# Patient Record
Sex: FEMALE | Race: WHITE | NOT HISPANIC OR LATINO | Employment: UNEMPLOYED | URBAN - METROPOLITAN AREA
[De-identification: names, ages, dates, MRNs, and addresses within clinical notes are randomized per-mention and may not be internally consistent; named-entity substitution may affect disease eponyms.]

---

## 2017-10-09 ENCOUNTER — APPOINTMENT (INPATIENT)
Dept: RADIOLOGY | Facility: HOSPITAL | Age: 52
DRG: 871 | End: 2017-10-09
Payer: COMMERCIAL

## 2017-10-09 ENCOUNTER — GENERIC CONVERSION - ENCOUNTER (OUTPATIENT)
Dept: OTHER | Facility: OTHER | Age: 52
End: 2017-10-09

## 2017-10-09 ENCOUNTER — HOSPITAL ENCOUNTER (INPATIENT)
Facility: HOSPITAL | Age: 52
LOS: 4 days | Discharge: HOME HEALTH | DRG: 871 | End: 2017-10-13
Attending: EMERGENCY MEDICINE | Admitting: ANESTHESIOLOGY
Payer: COMMERCIAL

## 2017-10-09 ENCOUNTER — APPOINTMENT (EMERGENCY)
Dept: RADIOLOGY | Facility: HOSPITAL | Age: 52
DRG: 871 | End: 2017-10-09
Payer: COMMERCIAL

## 2017-10-09 DIAGNOSIS — A41.9 SEVERE SEPSIS (HCC): ICD-10-CM

## 2017-10-09 DIAGNOSIS — A41.9 SEPTIC SHOCK (HCC): Primary | ICD-10-CM

## 2017-10-09 DIAGNOSIS — I95.9 HYPOTENSION: ICD-10-CM

## 2017-10-09 DIAGNOSIS — E11.9 DM2 (DIABETES MELLITUS, TYPE 2) (HCC): ICD-10-CM

## 2017-10-09 DIAGNOSIS — R65.20 SEVERE SEPSIS (HCC): ICD-10-CM

## 2017-10-09 DIAGNOSIS — F22 PARANOID (HCC): ICD-10-CM

## 2017-10-09 DIAGNOSIS — N17.9 ACUTE RENAL FAILURE (ARF) (HCC): ICD-10-CM

## 2017-10-09 DIAGNOSIS — N17.9 ACUTE RENAL FAILURE, UNSPECIFIED ACUTE RENAL FAILURE TYPE (HCC): ICD-10-CM

## 2017-10-09 DIAGNOSIS — R65.21 SEPTIC SHOCK (HCC): Primary | ICD-10-CM

## 2017-10-09 PROBLEM — G89.4 CHRONIC PAIN SYNDROME: Status: ACTIVE | Noted: 2017-10-09

## 2017-10-09 PROBLEM — R82.81 PYURIA: Status: ACTIVE | Noted: 2017-10-09

## 2017-10-09 PROBLEM — N30.00 ACUTE CYSTITIS: Status: ACTIVE | Noted: 2017-10-09

## 2017-10-09 PROBLEM — F41.1 GAD (GENERALIZED ANXIETY DISORDER): Status: ACTIVE | Noted: 2017-10-09

## 2017-10-09 PROBLEM — C18.9 COLON CANCER (HCC): Status: ACTIVE | Noted: 2017-10-09

## 2017-10-09 PROBLEM — R79.89 ELEVATED BRAIN NATRIURETIC PEPTIDE (BNP) LEVEL: Status: ACTIVE | Noted: 2017-10-09

## 2017-10-09 PROBLEM — D64.9 ANEMIA: Status: ACTIVE | Noted: 2017-10-09

## 2017-10-09 PROBLEM — C22.9 LIVER CANCER (HCC): Status: ACTIVE | Noted: 2017-10-09

## 2017-10-09 PROBLEM — Z90.49 STATUS POST COLECTOMY: Status: ACTIVE | Noted: 2017-10-09

## 2017-10-09 PROBLEM — Z99.89 OSA ON CPAP: Status: ACTIVE | Noted: 2017-10-09

## 2017-10-09 PROBLEM — I50.22 CHRONIC SYSTOLIC CONGESTIVE HEART FAILURE (HCC): Status: ACTIVE | Noted: 2017-10-09

## 2017-10-09 PROBLEM — F11.20 CONTINUOUS OPIOID DEPENDENCE (HCC): Status: ACTIVE | Noted: 2017-10-09

## 2017-10-09 PROBLEM — R41.82 ALTERED MENTAL STATUS: Status: ACTIVE | Noted: 2017-10-09

## 2017-10-09 PROBLEM — F32.9 MDD (MAJOR DEPRESSIVE DISORDER): Status: ACTIVE | Noted: 2017-10-09

## 2017-10-09 PROBLEM — I25.10 CAD (CORONARY ARTERY DISEASE): Status: ACTIVE | Noted: 2017-10-09

## 2017-10-09 PROBLEM — I25.10 CAD (CORONARY ARTERY DISEASE): Status: RESOLVED | Noted: 2017-10-09 | Resolved: 2017-10-09

## 2017-10-09 PROBLEM — D32.0 CEREBRAL MENINGIOMA (HCC): Status: ACTIVE | Noted: 2017-10-09

## 2017-10-09 PROBLEM — E78.5 HLD (HYPERLIPIDEMIA): Status: ACTIVE | Noted: 2017-10-09

## 2017-10-09 PROBLEM — G47.33 OSA ON CPAP: Status: ACTIVE | Noted: 2017-10-09

## 2017-10-09 PROBLEM — I10 HTN (HYPERTENSION): Status: ACTIVE | Noted: 2017-10-09

## 2017-10-09 LAB
ACETONE SERPL-MCNC: ABNORMAL MG/DL
ALBUMIN SERPL BCP-MCNC: 2.9 G/DL (ref 3.5–5)
ALP SERPL-CCNC: 108 U/L (ref 46–116)
ALT SERPL W P-5'-P-CCNC: 21 U/L (ref 12–78)
AMMONIA PLAS-SCNC: 30 UMOL/L (ref 11–35)
ANION GAP BLD CALC-SCNC: 21 MMOL/L (ref 4–13)
ANION GAP SERPL CALCULATED.3IONS-SCNC: 14 MMOL/L (ref 4–13)
ANION GAP SERPL CALCULATED.3IONS-SCNC: 15 MMOL/L (ref 4–13)
APTT PPP: 34 SECONDS (ref 23–35)
AST SERPL W P-5'-P-CCNC: 40 U/L (ref 5–45)
ATRIAL RATE: 121 BPM
BACTERIA UR QL AUTO: ABNORMAL /HPF
BASE EXCESS BLDA CALC-SCNC: -7.5 MMOL/L
BASOPHILS # BLD MANUAL: 0 THOUSAND/UL (ref 0–0.1)
BASOPHILS NFR MAR MANUAL: 0 % (ref 0–1)
BILIRUB SERPL-MCNC: 0.63 MG/DL (ref 0.2–1)
BILIRUB UR QL STRIP: NEGATIVE
BUN BLD-MCNC: 76 MG/DL (ref 5–25)
BUN SERPL-MCNC: 72 MG/DL (ref 5–25)
BUN SERPL-MCNC: 73 MG/DL (ref 5–25)
BURR CELLS BLD QL SMEAR: PRESENT
CA-I BLD-SCNC: 1.11 MMOL/L (ref 1.12–1.32)
CALCIUM SERPL-MCNC: 8.5 MG/DL (ref 8.3–10.1)
CALCIUM SERPL-MCNC: 8.8 MG/DL (ref 8.3–10.1)
CHLORIDE BLD-SCNC: 92 MMOL/L (ref 100–108)
CHLORIDE SERPL-SCNC: 100 MMOL/L (ref 100–108)
CHLORIDE SERPL-SCNC: 93 MMOL/L (ref 100–108)
CK MB SERPL-MCNC: 4.4 NG/ML (ref 0–5)
CK MB SERPL-MCNC: <1 % (ref 0–2.5)
CK SERPL-CCNC: 1088 U/L (ref 26–192)
CLARITY UR: ABNORMAL
CO2 SERPL-SCNC: 18 MMOL/L (ref 21–32)
CO2 SERPL-SCNC: 19 MMOL/L (ref 21–32)
COLOR UR: YELLOW
CORTIS SERPL-MCNC: 100.8 UG/DL
CREAT BLD-MCNC: 6.8 MG/DL (ref 0.6–1.3)
CREAT SERPL-MCNC: 6.57 MG/DL (ref 0.6–1.3)
CREAT SERPL-MCNC: 6.89 MG/DL (ref 0.6–1.3)
EOSINOPHIL # BLD MANUAL: 0 THOUSAND/UL (ref 0–0.4)
EOSINOPHIL NFR BLD MANUAL: 0 % (ref 0–6)
ERYTHROCYTE [DISTWIDTH] IN BLOOD BY AUTOMATED COUNT: 14.8 % (ref 11.6–15.1)
FINE GRAN CASTS URNS QL MICRO: ABNORMAL /LPF
GFR SERPL CREATININE-BSD FRML MDRD: 6 ML/MIN/1.73SQ M
GFR SERPL CREATININE-BSD FRML MDRD: 6 ML/MIN/1.73SQ M
GFR SERPL CREATININE-BSD FRML MDRD: 7 ML/MIN/1.73SQ M
GLUCOSE SERPL-MCNC: 376 MG/DL (ref 65–140)
GLUCOSE SERPL-MCNC: 381 MG/DL (ref 65–140)
GLUCOSE SERPL-MCNC: 454 MG/DL (ref 65–140)
GLUCOSE SERPL-MCNC: 491 MG/DL (ref 65–140)
GLUCOSE SERPL-MCNC: 492 MG/DL (ref 65–140)
GLUCOSE UR STRIP-MCNC: ABNORMAL MG/DL
HCO3 BLDA-SCNC: 16.5 MMOL/L (ref 22–28)
HCT VFR BLD AUTO: 33.2 % (ref 34.8–46.1)
HCT VFR BLD CALC: 36 % (ref 34.8–46.1)
HGB BLD-MCNC: 10.8 G/DL (ref 11.5–15.4)
HGB BLDA-MCNC: 12.2 G/DL (ref 11.5–15.4)
HGB UR QL STRIP.AUTO: ABNORMAL
INR PPP: 1.29 (ref 0.86–1.16)
KETONES UR STRIP-MCNC: NEGATIVE MG/DL
LACTATE SERPL-SCNC: 1.6 MMOL/L (ref 0.5–2)
LACTATE SERPL-SCNC: 2.1 MMOL/L (ref 0.5–2)
LACTATE SERPL-SCNC: 3.1 MMOL/L (ref 0.5–2)
LEUKOCYTE ESTERASE UR QL STRIP: ABNORMAL
LYMPHOCYTES # BLD AUTO: 0.34 THOUSAND/UL (ref 0.6–4.47)
LYMPHOCYTES # BLD AUTO: 2 % (ref 14–44)
MCH RBC QN AUTO: 26 PG (ref 26.8–34.3)
MCHC RBC AUTO-ENTMCNC: 32.5 G/DL (ref 31.4–37.4)
MCV RBC AUTO: 80 FL (ref 82–98)
MONOCYTES # BLD AUTO: 0.69 THOUSAND/UL (ref 0–1.22)
MONOCYTES NFR BLD: 4 % (ref 4–12)
NEUTROPHILS # BLD MANUAL: 16.21 THOUSAND/UL (ref 1.85–7.62)
NEUTS SEG NFR BLD AUTO: 94 % (ref 43–75)
NITRITE UR QL STRIP: NEGATIVE
NON-SQ EPI CELLS URNS QL MICRO: ABNORMAL /HPF
NRBC BLD AUTO-RTO: 0 /100 WBCS
NT-PROBNP SERPL-MCNC: 7285 PG/ML
O2 CT BLDA-SCNC: 15.1 ML/DL (ref 16–23)
OSMOLALITY UR: 337 MMOL/KG
OXYHGB MFR BLDA: 96.9 % (ref 94–97)
P AXIS: 72 DEGREES
PCO2 BLD: 19 MMOL/L (ref 21–32)
PCO2 BLDA: 28.6 MM HG (ref 36–44)
PH BLDA: 7.38 [PH] (ref 7.35–7.45)
PH UR STRIP.AUTO: 5.5 [PH] (ref 4.5–8)
PLATELET # BLD AUTO: 239 THOUSANDS/UL (ref 149–390)
PLATELET # BLD AUTO: 273 THOUSANDS/UL (ref 149–390)
PLATELET BLD QL SMEAR: ADEQUATE
PMV BLD AUTO: 11.5 FL (ref 8.9–12.7)
PMV BLD AUTO: 12 FL (ref 8.9–12.7)
PO2 BLDA: 104.5 MM HG (ref 75–129)
POIKILOCYTOSIS BLD QL SMEAR: PRESENT
POTASSIUM BLD-SCNC: 5 MMOL/L (ref 3.5–5.3)
POTASSIUM SERPL-SCNC: 3.8 MMOL/L (ref 3.5–5.3)
POTASSIUM SERPL-SCNC: 4.9 MMOL/L (ref 3.5–5.3)
PR INTERVAL: 136 MS
PROT SERPL-MCNC: 7.5 G/DL (ref 6.4–8.2)
PROT UR STRIP-MCNC: ABNORMAL MG/DL
PROTHROMBIN TIME: 16.2 SECONDS (ref 12.1–14.4)
QRS AXIS: 75 DEGREES
QRSD INTERVAL: 86 MS
QT INTERVAL: 342 MS
QTC INTERVAL: 485 MS
RBC # BLD AUTO: 4.16 MILLION/UL (ref 3.81–5.12)
RBC #/AREA URNS AUTO: ABNORMAL /HPF
RBC MORPH BLD: PRESENT
SODIUM 24H UR-SCNC: 61 MOL/L
SODIUM BLD-SCNC: 127 MMOL/L (ref 136–145)
SODIUM SERPL-SCNC: 127 MMOL/L (ref 136–145)
SODIUM SERPL-SCNC: 132 MMOL/L (ref 136–145)
SP GR UR STRIP.AUTO: 1.02 (ref 1–1.03)
SPECIMEN SOURCE: ABNORMAL
SPECIMEN SOURCE: ABNORMAL
T WAVE AXIS: 71 DEGREES
UROBILINOGEN UR QL STRIP.AUTO: 0.2 E.U./DL
VENTRICULAR RATE: 121 BPM
WBC # BLD AUTO: 17.24 THOUSAND/UL (ref 4.31–10.16)
WBC #/AREA URNS AUTO: ABNORMAL /HPF

## 2017-10-09 PROCEDURE — 82533 TOTAL CORTISOL: CPT | Performed by: PHYSICIAN ASSISTANT

## 2017-10-09 PROCEDURE — 87077 CULTURE AEROBIC IDENTIFY: CPT | Performed by: EMERGENCY MEDICINE

## 2017-10-09 PROCEDURE — 93005 ELECTROCARDIOGRAM TRACING: CPT | Performed by: EMERGENCY MEDICINE

## 2017-10-09 PROCEDURE — 02HV33Z INSERTION OF INFUSION DEVICE INTO SUPERIOR VENA CAVA, PERCUTANEOUS APPROACH: ICD-10-PCS | Performed by: HOSPITALIST

## 2017-10-09 PROCEDURE — 80053 COMPREHEN METABOLIC PANEL: CPT | Performed by: EMERGENCY MEDICINE

## 2017-10-09 PROCEDURE — 85014 HEMATOCRIT: CPT

## 2017-10-09 PROCEDURE — 80047 BASIC METABLC PNL IONIZED CA: CPT

## 2017-10-09 PROCEDURE — 85027 COMPLETE CBC AUTOMATED: CPT | Performed by: EMERGENCY MEDICINE

## 2017-10-09 PROCEDURE — 84300 ASSAY OF URINE SODIUM: CPT | Performed by: PHYSICIAN ASSISTANT

## 2017-10-09 PROCEDURE — 82948 REAGENT STRIP/BLOOD GLUCOSE: CPT

## 2017-10-09 PROCEDURE — 87186 SC STD MICRODIL/AGAR DIL: CPT | Performed by: EMERGENCY MEDICINE

## 2017-10-09 PROCEDURE — 36415 COLL VENOUS BLD VENIPUNCTURE: CPT | Performed by: EMERGENCY MEDICINE

## 2017-10-09 PROCEDURE — 83605 ASSAY OF LACTIC ACID: CPT | Performed by: EMERGENCY MEDICINE

## 2017-10-09 PROCEDURE — 70450 CT HEAD/BRAIN W/O DYE: CPT

## 2017-10-09 PROCEDURE — 82550 ASSAY OF CK (CPK): CPT | Performed by: EMERGENCY MEDICINE

## 2017-10-09 PROCEDURE — 87086 URINE CULTURE/COLONY COUNT: CPT

## 2017-10-09 PROCEDURE — 72125 CT NECK SPINE W/O DYE: CPT

## 2017-10-09 PROCEDURE — 83605 ASSAY OF LACTIC ACID: CPT | Performed by: PHYSICIAN ASSISTANT

## 2017-10-09 PROCEDURE — 83880 ASSAY OF NATRIURETIC PEPTIDE: CPT | Performed by: EMERGENCY MEDICINE

## 2017-10-09 PROCEDURE — 82009 KETONE BODYS QUAL: CPT | Performed by: PHYSICIAN ASSISTANT

## 2017-10-09 PROCEDURE — 85049 AUTOMATED PLATELET COUNT: CPT | Performed by: PHYSICIAN ASSISTANT

## 2017-10-09 PROCEDURE — 96365 THER/PROPH/DIAG IV INF INIT: CPT

## 2017-10-09 PROCEDURE — 82805 BLOOD GASES W/O2 SATURATION: CPT | Performed by: PHYSICIAN ASSISTANT

## 2017-10-09 PROCEDURE — 93005 ELECTROCARDIOGRAM TRACING: CPT

## 2017-10-09 PROCEDURE — 71010 HB CHEST X-RAY 1 VIEW FRONTAL (PORTABLE): CPT

## 2017-10-09 PROCEDURE — 36620 INSERTION CATHETER ARTERY: CPT

## 2017-10-09 PROCEDURE — 81001 URINALYSIS AUTO W/SCOPE: CPT

## 2017-10-09 PROCEDURE — 99285 EMERGENCY DEPT VISIT HI MDM: CPT

## 2017-10-09 PROCEDURE — 87077 CULTURE AEROBIC IDENTIFY: CPT

## 2017-10-09 PROCEDURE — 87186 SC STD MICRODIL/AGAR DIL: CPT

## 2017-10-09 PROCEDURE — 85007 BL SMEAR W/DIFF WBC COUNT: CPT | Performed by: EMERGENCY MEDICINE

## 2017-10-09 PROCEDURE — 74176 CT ABD & PELVIS W/O CONTRAST: CPT

## 2017-10-09 PROCEDURE — 87040 BLOOD CULTURE FOR BACTERIA: CPT | Performed by: EMERGENCY MEDICINE

## 2017-10-09 PROCEDURE — 71250 CT THORAX DX C-: CPT

## 2017-10-09 PROCEDURE — 82553 CREATINE MB FRACTION: CPT | Performed by: EMERGENCY MEDICINE

## 2017-10-09 PROCEDURE — 85730 THROMBOPLASTIN TIME PARTIAL: CPT | Performed by: EMERGENCY MEDICINE

## 2017-10-09 PROCEDURE — 83935 ASSAY OF URINE OSMOLALITY: CPT | Performed by: PHYSICIAN ASSISTANT

## 2017-10-09 PROCEDURE — 96361 HYDRATE IV INFUSION ADD-ON: CPT

## 2017-10-09 PROCEDURE — 85610 PROTHROMBIN TIME: CPT | Performed by: EMERGENCY MEDICINE

## 2017-10-09 PROCEDURE — 80048 BASIC METABOLIC PNL TOTAL CA: CPT | Performed by: PHYSICIAN ASSISTANT

## 2017-10-09 PROCEDURE — 82140 ASSAY OF AMMONIA: CPT | Performed by: PHYSICIAN ASSISTANT

## 2017-10-09 RX ORDER — ACETAMINOPHEN 325 MG/1
650 TABLET ORAL ONCE
Status: COMPLETED | OUTPATIENT
Start: 2017-10-09 | End: 2017-10-09

## 2017-10-09 RX ORDER — HEPARIN SODIUM 5000 [USP'U]/ML
5000 INJECTION, SOLUTION INTRAVENOUS; SUBCUTANEOUS EVERY 8 HOURS SCHEDULED
Status: DISCONTINUED | OUTPATIENT
Start: 2017-10-09 | End: 2017-10-10

## 2017-10-09 RX ORDER — NOREPINEPHRINE BITARTRATE 1 MG/ML
INJECTION, SOLUTION INTRAVENOUS
Status: COMPLETED
Start: 2017-10-09 | End: 2017-10-09

## 2017-10-09 RX ORDER — 0.9 % SODIUM CHLORIDE 0.9 %
3 VIAL (ML) INJECTION AS NEEDED
Status: DISCONTINUED | OUTPATIENT
Start: 2017-10-09 | End: 2017-10-13 | Stop reason: HOSPADM

## 2017-10-09 RX ORDER — LIDOCAINE HYDROCHLORIDE 10 MG/ML
INJECTION, SOLUTION EPIDURAL; INFILTRATION; INTRACAUDAL; PERINEURAL
Status: COMPLETED
Start: 2017-10-09 | End: 2017-10-09

## 2017-10-09 RX ORDER — ASPIRIN 81 MG/1
81 TABLET, CHEWABLE ORAL DAILY
Status: DISCONTINUED | OUTPATIENT
Start: 2017-10-09 | End: 2017-10-13 | Stop reason: HOSPADM

## 2017-10-09 RX ORDER — INSULIN ASPART 100 [IU]/ML
60 INJECTION, SUSPENSION SUBCUTANEOUS
Status: DISCONTINUED | OUTPATIENT
Start: 2017-10-09 | End: 2017-10-09

## 2017-10-09 RX ORDER — ATORVASTATIN CALCIUM 20 MG/1
20 TABLET, FILM COATED ORAL
Status: DISCONTINUED | OUTPATIENT
Start: 2017-10-09 | End: 2017-10-11

## 2017-10-09 RX ORDER — PANTOPRAZOLE SODIUM 40 MG/1
40 TABLET, DELAYED RELEASE ORAL
Status: DISCONTINUED | OUTPATIENT
Start: 2017-10-10 | End: 2017-10-11

## 2017-10-09 RX ORDER — ACETAMINOPHEN 325 MG/1
650 TABLET ORAL EVERY 6 HOURS PRN
Status: DISCONTINUED | OUTPATIENT
Start: 2017-10-09 | End: 2017-10-10

## 2017-10-09 RX ORDER — ALBUMIN, HUMAN INJ 5% 5 %
25 SOLUTION INTRAVENOUS ONCE
Status: COMPLETED | OUTPATIENT
Start: 2017-10-09 | End: 2017-10-10

## 2017-10-09 RX ORDER — PREGABALIN 25 MG/1
50 CAPSULE ORAL 3 TIMES DAILY
Status: DISCONTINUED | OUTPATIENT
Start: 2017-10-09 | End: 2017-10-09

## 2017-10-09 RX ORDER — ACETAMINOPHEN 650 MG/1
325 SUPPOSITORY RECTAL EVERY 4 HOURS PRN
Status: DISCONTINUED | OUTPATIENT
Start: 2017-10-09 | End: 2017-10-12

## 2017-10-09 RX ORDER — PREGABALIN 100 MG/1
100 CAPSULE ORAL 3 TIMES DAILY
Status: DISCONTINUED | OUTPATIENT
Start: 2017-10-09 | End: 2017-10-09

## 2017-10-09 RX ORDER — SODIUM CHLORIDE, SODIUM GLUCONATE, SODIUM ACETATE, POTASSIUM CHLORIDE, MAGNESIUM CHLORIDE, SODIUM PHOSPHATE, DIBASIC, AND POTASSIUM PHOSPHATE .53; .5; .37; .037; .03; .012; .00082 G/100ML; G/100ML; G/100ML; G/100ML; G/100ML; G/100ML; G/100ML
1000 INJECTION, SOLUTION INTRAVENOUS ONCE
Status: DISCONTINUED | OUTPATIENT
Start: 2017-10-09 | End: 2017-10-09

## 2017-10-09 RX ORDER — ONDANSETRON 2 MG/ML
4 INJECTION INTRAMUSCULAR; INTRAVENOUS EVERY 6 HOURS PRN
Status: DISCONTINUED | OUTPATIENT
Start: 2017-10-09 | End: 2017-10-13 | Stop reason: HOSPADM

## 2017-10-09 RX ORDER — VANCOMYCIN HYDROCHLORIDE 1 G/200ML
1000 INJECTION, SOLUTION INTRAVENOUS ONCE
Status: COMPLETED | OUTPATIENT
Start: 2017-10-09 | End: 2017-10-10

## 2017-10-09 RX ORDER — DULOXETIN HYDROCHLORIDE 60 MG/1
60 CAPSULE, DELAYED RELEASE ORAL 2 TIMES DAILY
Status: DISCONTINUED | OUTPATIENT
Start: 2017-10-09 | End: 2017-10-09

## 2017-10-09 RX ORDER — MAGNESIUM HYDROXIDE/ALUMINUM HYDROXICE/SIMETHICONE 120; 1200; 1200 MG/30ML; MG/30ML; MG/30ML
30 SUSPENSION ORAL EVERY 6 HOURS PRN
Status: DISCONTINUED | OUTPATIENT
Start: 2017-10-09 | End: 2017-10-13 | Stop reason: HOSPADM

## 2017-10-09 RX ORDER — FAMOTIDINE 20 MG/1
20 TABLET, FILM COATED ORAL DAILY
Status: DISCONTINUED | OUTPATIENT
Start: 2017-10-09 | End: 2017-10-09

## 2017-10-09 RX ORDER — SODIUM CHLORIDE, SODIUM GLUCONATE, SODIUM ACETATE, POTASSIUM CHLORIDE, MAGNESIUM CHLORIDE, SODIUM PHOSPHATE, DIBASIC, AND POTASSIUM PHOSPHATE .53; .5; .37; .037; .03; .012; .00082 G/100ML; G/100ML; G/100ML; G/100ML; G/100ML; G/100ML; G/100ML
100 INJECTION, SOLUTION INTRAVENOUS CONTINUOUS
Status: DISCONTINUED | OUTPATIENT
Start: 2017-10-09 | End: 2017-10-12

## 2017-10-09 RX ORDER — SODIUM CHLORIDE 9 MG/ML
100 INJECTION, SOLUTION INTRAVENOUS CONTINUOUS
Status: DISCONTINUED | OUTPATIENT
Start: 2017-10-09 | End: 2017-10-09

## 2017-10-09 RX ADMIN — SODIUM CHLORIDE 500 ML: 0.9 INJECTION, SOLUTION INTRAVENOUS at 11:10

## 2017-10-09 RX ADMIN — NOREPINEPHRINE BITARTRATE 12 MCG/MIN: 1 INJECTION INTRAVENOUS at 22:10

## 2017-10-09 RX ADMIN — ACETAMINOPHEN 325 MG: 650 SUPPOSITORY RECTAL at 22:08

## 2017-10-09 RX ADMIN — SODIUM CHLORIDE 1000 ML: 0.9 INJECTION, SOLUTION INTRAVENOUS at 13:46

## 2017-10-09 RX ADMIN — VANCOMYCIN HYDROCHLORIDE 1000 MG: 1 INJECTION, SOLUTION INTRAVENOUS at 23:46

## 2017-10-09 RX ADMIN — LIDOCAINE HYDROCHLORIDE: 10 INJECTION, SOLUTION EPIDURAL; INFILTRATION; INTRACAUDAL; PERINEURAL at 20:45

## 2017-10-09 RX ADMIN — ACETAMINOPHEN 325 MG: 650 SUPPOSITORY RECTAL at 18:54

## 2017-10-09 RX ADMIN — ACETAMINOPHEN 650 MG: 325 TABLET, FILM COATED ORAL at 11:47

## 2017-10-09 RX ADMIN — INSULIN ASPART 60 UNITS: 100 INJECTION, SUSPENSION SUBCUTANEOUS at 18:15

## 2017-10-09 RX ADMIN — SODIUM CHLORIDE, SODIUM GLUCONATE, SODIUM ACETATE, POTASSIUM CHLORIDE, MAGNESIUM CHLORIDE, SODIUM PHOSPHATE, DIBASIC, AND POTASSIUM PHOSPHATE 100 ML/HR: .53; .5; .37; .037; .03; .012; .00082 INJECTION, SOLUTION INTRAVENOUS at 20:32

## 2017-10-09 RX ADMIN — SODIUM CHLORIDE 75 ML/HR: 0.9 INJECTION, SOLUTION INTRAVENOUS at 16:09

## 2017-10-09 RX ADMIN — SODIUM CHLORIDE 1000 ML: 0.9 INJECTION, SOLUTION INTRAVENOUS at 17:50

## 2017-10-09 RX ADMIN — SODIUM CHLORIDE 1000 ML: 0.9 INJECTION, SOLUTION INTRAVENOUS at 20:30

## 2017-10-09 RX ADMIN — SODIUM CHLORIDE 5 UNITS/HR: 9 INJECTION, SOLUTION INTRAVENOUS at 21:45

## 2017-10-09 RX ADMIN — VASOPRESSIN 0.03 UNITS/MIN: 20 INJECTION INTRAVENOUS at 22:38

## 2017-10-09 RX ADMIN — INSULIN LISPRO 25 UNITS: 100 INJECTION, SOLUTION INTRAVENOUS; SUBCUTANEOUS at 18:15

## 2017-10-09 RX ADMIN — CEFEPIME 2000 MG: 2 INJECTION, POWDER, FOR SOLUTION INTRAMUSCULAR; INTRAVENOUS at 12:37

## 2017-10-09 RX ADMIN — NOREPINEPHRINE BITARTRATE: 1 INJECTION INTRAVENOUS at 21:15

## 2017-10-09 RX ADMIN — ALBUMIN HUMAN 25 G: 0.05 INJECTION, SOLUTION INTRAVENOUS at 22:00

## 2017-10-09 NOTE — SEPSIS NOTE
Sepsis Note   Jocelyn Foots 47 y o  female MRN: [id number]  Unit/Bed#: ED 22 Encounter: [id number]            Initial Sepsis Screening     Row Name 10/09/17 1300                Is the patient's history suggestive of a new or worsening infection? (!)  Yes (Proceed)  -KN        Suspected source of infection suspect infection, source unknown  -KN        Are two or more of the following signs & symptoms of infection both present and new to the patient?         Indicate SIRS criteria Hyperthemia > 38 3C (100 9F); Leukocytosis (WBC > 04364 IJL); Altered mental status; Tachycardia > 90 bpm  -KN        If the answer is yes to both questions, suspicion of sepsis is present          If severe sepsis is present AND tissue hypoperfusion perists in the hour after fluid resuscitation or lactate > 4, the patient meets criteria for SEPTIC SHOCK          Are any of the following organ dysfunction criteria present within 6 hours of suspected infection and SIRS criteria that are NOT considered to be chronic conditions? (!)  Yes  -KN        Organ dysfunction Creatinine > 2 0 mg/dL; Lactate > 2 0 mmol/L  -KN        Date of presentation of severe sepsis          Time of presentation of severe sepsis          Tissue hypoperfusion persists in the hour after crystalloid fluid administration, evidenced, by either:          Was hypotension present within one hour of the conclusion of crystalloid fluid administration?           Date of presentation of septic shock          Time of presentation of septic shock            User Key  (r) = Recorded By, (t) = Taken By, (c) = Cosigned By    [address] Name Provider Barbie Cowden, MD Resident               Default Flowsheet Data (last 720 hours)      Sepsis Reassessment     Row Name 10/09/17 1459                   Volume Status and Tissue Perfusion Post Fluid Resuscitation- Must Document ALL of the Following:    Vital Signs Reviewed Yes  -KN        Cardio (!)  Tachycardia  -KN Pulmonary Normal effort  -KN        Capillary Refill Brisk  -KN        Peripheral Pulses Radial  -KN        Peripheral Pulse +2  -KN        Skin Warm  -KN           *OR*   Intensive Monitoring- Must Document Two * of the Following Four *:    Vital Signs Reviewed Yes  -KN        * Central Venous Pressure (CVP or RAP)          * Central Venous Oxygen (SVO2, ScvO2 or Oxygen saturation via central catheter)          * Bedside Cardiovascular US in IVC diameter and % collapse          * Passive Leg Raise OR Crystalloid Challenge            User Key  (r) = Recorded By, (t) = Taken By, (c) = Cosigned By    Initials Name Provider Type    Neel Thompson MD Resident            Patient was not given 30 cc/kilos given the fact she has CHF 2 with a high BNP  Patient was given 1 5 L of normal saline  Lactate improved,  Tachycardia is resolving  At no time has patient become hypotensive

## 2017-10-09 NOTE — ED PROVIDER NOTES
History  Chief Complaint   Patient presents with    Altered Mental Status     Per EMS, pt was found down by a family friend and it is unknown when she fell  Pt was altered when EMS picked her up to bring to ED  Pt was found with urine and feces and lying on debris on the floor     HPI   Patient is a 81-year-old woman with past medical history of CHF, colon cancer, diabetes who presents emergency department for evaluation after being found down  Per EMS, patient was lying on the ground next to her chair in feces and urine  Family had not seen the patient since the night prior  Patient is unclear what happened and is confused about the events surrounding her falling  Patient is alert, oriented to time and place   But seems confused  Patient denies any recent illness, chest pain shortness of breath abdominal pain nausea vomiting  The patient does say that she has history of CHF  Prior to Admission Medications   Prescriptions Last Dose Informant Patient Reported? Taking? ASPIRIN ADULT LOW DOSE PO   Yes Yes   Sig: Take by mouth   ATORVASTATIN CALCIUM PO   Yes Yes   Sig: Take by mouth   CARVEDILOL PO   Yes Yes   Sig: Take by mouth   ClonazePAM (KLONOPIN PO)   Yes Yes   Sig: Take by mouth   DULoxetine HCl (CYMBALTA PO)   Yes Yes   Sig: Take by mouth   LISINOPRIL PO   Yes Yes   Sig: Take by mouth   MELOXICAM PO   Yes Yes   Sig: Take by mouth   METFORMIN HCL PO   Yes Yes   Sig: Take by mouth   OxyMORphone HCl (OPANA PO)   Yes Yes   Sig: Take by mouth   Pregabalin (LYRICA PO)   Yes Yes   Sig: Take by mouth   RaNITidine HCl (ZANTAC PO)   Yes Yes   Sig: Take by mouth      Facility-Administered Medications: None       Past Medical History:   Diagnosis Date    Cancer ([facility] )     colon    CHF (congestive heart failure) (HCC)     Diabetes mellitus ([facility] )     Hypertension     Psychiatric disorder        Past Surgical History:   Procedure Laterality Date    COLON SURGERY         History reviewed   No pertinent family history  I have reviewed and agree with the history as documented  Social History   Substance Use Topics    Smoking status: Never Smoker    Smokeless tobacco: Never Used    Alcohol use No        Review of Systems   Constitutional: Positive for chills and fever  Negative for activity change and diaphoresis  HENT: Negative for ear pain, trouble swallowing and voice change  Eyes: Negative for pain  Respiratory: Negative for chest tightness, shortness of breath, wheezing and stridor  Cardiovascular: Negative for chest pain, palpitations and leg swelling  Gastrointestinal: Negative for abdominal distention, abdominal pain, constipation, diarrhea, nausea and vomiting  Endocrine: Negative for polyuria  Genitourinary: Negative for dysuria and frequency  Musculoskeletal: Positive for back pain  Negative for myalgias  Skin: Negative for rash  Neurological: Negative for dizziness, syncope, weakness and headaches  Psychiatric/Behavioral: Negative for agitation and dysphoric mood  Physical Exam  ED Triage Vitals   Temperature Pulse Respirations Blood Pressure SpO2   10/09/17 1102 10/09/17 1102 10/09/17 1102 10/09/17 1107 10/09/17 1103   (!) 102 1 °F (38 9 °C) (!) 122 20 138/60 95 %      Temp Source Heart Rate Source Patient Position - Orthostatic VS BP Location FiO2 (%)   10/09/17 1102 10/09/17 1102 10/09/17 1556 10/09/17 1556 --   Rectal Monitor Lying Left arm       Pain Score       10/09/17 1237       No Pain           Physical Exam   Constitutional: She appears well-developed and well-nourished  No distress  HENT:   Head: Normocephalic  Right Ear: External ear normal    Left Ear: External ear normal    Patient has bruce on her forehead suspicious for trauma  Mucous membranes are dry   Eyes: Conjunctivae and EOM are normal  Pupils are equal, round, and reactive to light  Right eye exhibits no discharge  Left eye exhibits no discharge  No scleral icterus  Neck: Normal range of motion  Neck supple  No tracheal deviation present  No thyromegaly present  Cardiovascular: Normal rate, regular rhythm and intact distal pulses  Exam reveals no gallop and no friction rub  No murmur heard  Pulmonary/Chest: Effort normal and breath sounds normal  No stridor  No respiratory distress  She has no wheezes  She has no rales  Abdominal: Soft  Bowel sounds are normal  She exhibits no distension  There is no tenderness  There is no rebound and no guarding  Musculoskeletal: Normal range of motion  She exhibits tenderness  She exhibits no edema or deformity  Patient has midline tenderness of her thoracic spine  Neurological: She is alert  No cranial nerve deficit  Patient is confused   Skin: Skin is warm and dry  No rash noted  She is not diaphoretic  No erythema  Psychiatric: She has a normal mood and affect  Her behavior is normal  Thought content normal    Nursing note and vitals reviewed        ED Medications  Medications   sodium chloride (PF) 0 9 % injection 3 mL ( Intravenous MAR Unhold 10/9/17 2033)   aspirin chewable tablet 81 mg (81 mg Oral Given 10/10/17 0805)   atorvastatin (LIPITOR) tablet 20 mg (20 mg Oral Given 10/10/17 1619)   ondansetron (ZOFRAN) injection 4 mg ( Intravenous MAR Unhold 10/9/17 2033)   aluminum-magnesium hydroxide-simethicone (MYLANTA) 200-200-20 mg/5 mL oral suspension 30 mL ( Oral MAR Unhold 10/9/17 2033)   pantoprazole (PROTONIX) EC tablet 40 mg (40 mg Oral Not Given 10/10/17 0612)   insulin regular (HumuLIN R,NovoLIN R) 1 Units/mL in sodium chloride 0 9 % 100 mL infusion (12 Units/hr Intravenous Rate/Dose Change 10/10/17 1823)   acetaminophen (TYLENOL) rectal suppository 325 mg (325 mg Rectal Given 10/9/17 2208)   multi-electrolyte (ISOLYTE-S PH 7 4 equivalent) IV solution (100 mL/hr Intravenous New Bag 10/10/17 1457)   norepinephrine (LEVOPHED) 4 mg (STANDARD CONCENTRATION) IV in sodium chloride 0 9% 250 mL (0 mcg/min Intravenous Stopped 10/10/17 1640) vasopressin (PITRESSIN) 20 Units in sodium chloride 0 9 % 100 mL infusion (0 03 Units/min Intravenous New Bag 10/10/17 1727)   senna (SENOKOT) tablet 8 6 mg (not administered)   polyethylene glycol (MIRALAX) packet 17 g (not administered)   cyclobenzaprine (FLEXERIL) tablet 10 mg (not administered)   acetaminophen (TYLENOL) tablet 650 mg (650 mg Oral Given 10/10/17 1822)   heparin (porcine) subcutaneous injection 7,500 Units (7,500 Units Subcutaneous Given 10/10/17 1355)   cefepime (MAXIPIME) 1,000 mg in dextrose 5 % 50 mL IVPB (0 mg Intravenous Stopped 10/10/17 1220)   acetaminophen (TYLENOL) tablet 650 mg (650 mg Oral Given 10/9/17 1147)   sodium chloride 0 9 % bolus 500 mL (0 mL Intravenous Stopped 10/9/17 1200)   cefepime (MAXIPIME) 2 g/50 mL dextrose IVPB (0 mg Intravenous Stopped 10/10/17 0741)   sodium chloride 0 9 % bolus 1,000 mL (1,000 mL Intravenous New Bag 10/9/17 1346)   sodium chloride 0 9 % bolus 1,000 mL (1,000 mL Intravenous New Bag 10/9/17 1750)   vancomycin (VANCOCIN) IVPB (premix) 1,000 mg (0 mg Intravenous Stopped 10/10/17 0046)   sodium chloride 0 9 % bolus 1,000 mL (0 mL Intravenous Stopped 10/9/17 2200)   lidocaine (PF) (XYLOCAINE-MPF) 1 % injection **AcuDose Override Pull** (  Given 10/9/17 2045)   norepinephrine (LEVOPHED) 1 mg/mL injection **AcuDose Override Pull** (  Given 10/9/17 2115)   albumin human (FLEXBUMIN) 5 % injection 25 g (0 g Intravenous Stopped 10/10/17 0741)   lidocaine (PF) (XYLOCAINE-MPF) 1 % injection **AcuDose Override Pull** (1 mL  Given 10/10/17 0532)   potassium chloride (K-DUR,KLOR-CON) CR tablet 40 mEq (40 mEq Oral Given 10/10/17 0804)   magnesium sulfate IVPB (premix) SOLN 1 g (0 g Intravenous Stopped 10/10/17 1040)   multi-electrolyte (ISOLYTE-S PH 7 4) bolus 500 mL (0 mL Intravenous Stopped 10/10/17 0822)   multi-electrolyte (ISOLYTE-S PH 7 4) bolus 500 mL (0 mL Intravenous Stopped 10/10/17 1004)   potassium chloride (K-DUR,KLOR-CON) CR tablet 60 mEq (60 mEq Oral Given 10/10/17 1443)   multi-electrolyte (ISOLYTE-S PH 7 4) bolus 500 mL (0 mL Intravenous Stopped 10/10/17 1456)       Diagnostic Studies  Labs Reviewed   CBC AND DIFFERENTIAL - Abnormal        Result Value Ref Range Status    WBC 17 24 (*) 4 31 - 10 16 Thousand/uL Final    Hemoglobin 10 8 (*) 11 5 - 15 4 g/dL Final    Hematocrit 33 2 (*) 34 8 - 46 1 % Final    MCV 80 (*) 82 - 98 fL Final    MCH 26 0 (*) 26 8 - 34 3 pg Final    RBC 4 16  3 81 - 5 12 Million/uL Final    MCHC 32 5  31 4 - 37 4 g/dL Final    RDW 14 8  11 6 - 15 1 % Final    MPV 11 5  8 9 - 12 7 fL Final    Platelets 815  950 - 390 Thousands/uL Final    nRBC 0  /100 WBCs Final    Comment: This is an appended report  These results have been appended to a previously preliminary verified report  Narrative: This is an appended report  These results have been appended to a previously verified report  COMPREHENSIVE METABOLIC PANEL - Abnormal     Sodium 127 (*) 136 - 145 mmol/L Final    Chloride 93 (*) 100 - 108 mmol/L Final    CO2 19 (*) 21 - 32 mmol/L Final    Anion Gap 15 (*) 4 - 13 mmol/L Final    BUN 72 (*) 5 - 25 mg/dL Final    Creatinine 6 89 (*) 0 60 - 1 30 mg/dL Final    Comment: Standardized to IDMS reference method    Glucose 491 (*) 65 - 140 mg/dL Final    Comment:   If the patient is fasting, the ADA then defines impaired fasting glucose as > 100 mg/dL and diabetes as > or equal to 123 mg/dL  Specimen collection should occur prior to Sulfasalazine administration due to the potential for falsely depressed results  Specimen collection should occur prior to Sulfapyridine administration due to the potential for falsely elevated results  Albumin 2 9 (*) 3 5 - 5 0 g/dL Final    Potassium 4 9  3 5 - 5 3 mmol/L Final    Calcium 8 8  8 3 - 10 1 mg/dL Final    AST 40  5 - 45 U/L Final    Comment:   Specimen collection should occur prior to Sulfasalazine administration due to the potential for falsely depressed results       ALT 21  12 - 78 U/L Final    Comment:   Specimen collection should occur prior to Sulfasalazine and/or Sulfapyridine administration due to the potential for falsely depressed results  Alkaline Phosphatase 108  46 - 116 U/L Final    Total Protein 7 5  6 4 - 8 2 g/dL Final    Total Bilirubin 0 63  0 20 - 1 00 mg/dL Final    eGFR 6  ml/min/1 73sq m Final    Narrative:     National Kidney Disease Education Program recommendations are as follows:  GFR calculation is accurate only with a steady state creatinine  Chronic Kidney disease less than 60 ml/min/1 73 sq  meters  Kidney failure less than 15 ml/min/1 73 sq  meters  LACTIC ACID, PLASMA - Abnormal     LACTIC ACID 2 1 (*) 0 5 - 2 0 mmol/L Final    Narrative:     Result may be elevated if tourniquet was used during collection     PROTIME-INR - Abnormal     Protime 16 2 (*) 12 1 - 14 4 seconds Final    INR 1 29 (*) 0 86 - 1 16 Final   NT-BNP PRO (BRAIN NATRIURETIC PEPTIDE) - Abnormal     NT-proBNP 7,285 (*) <125 pg/mL Final   URINE MICROSCOPIC - Abnormal     RBC, UA 2-4 (*) None Seen, 0-5 /hpf Final    WBC, UA Innumerable (*) None Seen, 0-5, 5-55, 5-65 /hpf Final    Bacteria, UA Innumerable (*) None Seen, Occasional /hpf Final    Epithelial Cells None Seen  None Seen, Occasional /hpf Final    Fine granular casts 0-3  /lpf Final   ED URINE MACROSCOPIC - Abnormal     Leukocytes, UA Moderate (*) Negative Final    Protein,  (2+) (*) Negative mg/dl Final    Glucose,  (1/4%) (*) Negative mg/dl Final    Blood, UA Moderate (*) Negative Final    Color, UA Yellow   Final    Clarity, UA Cloudy   Final    pH, UA 5 5  4 5 - 8 0 Final    Nitrite, UA Negative  Negative Final    Ketones, UA Negative  Negative mg/dl Final    Urobilinogen, UA 0 2  0 2, 1 0 E U /dl E U /dl Final    Bilirubin, UA Negative  Negative Final    Specific Gravity, UA 1 020  1 003 - 1 030 Final    Narrative:     CLINITEK RESULT   POCT CHEM 8+ - Abnormal     SODIUM, I-STAT 127 (*) 136 - 145 mmol/l Final Chloride, istat 92 (*) 100 - 108 mmol/L Final    CO2, i-STAT 19 (*) 21 - 32 mmol/L Final    Anion Gap, Istat 21 (*) 4 - 13 mmol/L Final    Calcium, Ionized i-STAT 1 11 (*) 1 12 - 1 32 mmol/L Final    BUN, I-STAT 76 (*) 5 - 25 mg/dl Final    Creatinine, i-STAT 6 8 (*) 0 6 - 1 3 mg/dl Final    Glucose, i-STAT 492 (*) 65 - 140 mg/dl Final    Potassium, i-STAT 5 0  3 5 - 5 3 mmol/L Final    eGFR 6  ml/min/1 73sq m Final    Hct, i-STAT 36  34 8 - 46 1 % Final    Hgb, i-STAT 12 2  11 5 - 15 4 g/dl Final    Specimen Type VENOUS   Final   MANUAL DIFFERENTIAL(PHLEBS DO NOT ORDER) - Abnormal     Segmented % 94 (*) 43 - 75 % Final    Lymphocytes % 2 (*) 14 - 44 % Final    Absolute Neutrophils 16 21 (*) 1 85 - 7 62 Thousand/uL Final    Lymphocytes Absolute 0 34 (*) 0 60 - 4 47 Thousand/uL Final    Monocytes % 4  4 - 12 % Final    Eosinophils % 0  0 - 6 % Final    Basophils % 0  0 - 1 % Final    Monocytes Absolute 0 69  0 00 - 1 22 Thousand/uL Final    Eosinophils Absolute 0 00  0 00 - 0 40 Thousand/uL Final    Basophils Absolute 0 00  0 00 - 0 10 Thousand/uL Final    Total Counted     Final    RBC Morphology Present   Final    [place] Cells Present   Final    Poikilocytes Present   Final    Platelet Estimate Adequate  Adequate Final   LACTIC ACID, PLASMA - Normal    LACTIC ACID 1 6  0 5 - 2 0 mmol/L Final    Narrative:     Result may be elevated if tourniquet was used during collection  APTT - Normal    PTT 34  23 - 35 seconds Final    Narrative: Therapeutic Heparin Range = 60-90 seconds       XR chest portable   Final Result      Appropriately positioned right IJ catheter  No acute cardiopulmonary findings  Workstation performed: [id number]         XR chest portable   Final Result      1  Right IJ catheter tip projects over the high right atrium  No pneumothorax  2   Mild pulmonary vascular congestion without other acute cardiomegaly findings           Workstation performed: [id number]         CT head wo contrast   Final Result   Stable exam    No acute intracranial abnormality  Workstation performed: [id number]         CT chest abdomen pelvis wo contrast   Final Result      Although the study was limited by the lack of intravenous contrast, there is no gross evidence of solid organ injury  10 mm right lower lobe groundglass nodular opacity  A short-term follow-up CT chest in 3 months is recommended for further evaluation  No acute intra-abdominal abnormality  No free air or free fluid  15 mm left adrenal adenoma  ##[id number]##sigslh      ##[id number]##[email or web address]          Workstation performed: [id number]         CT spine cervical without contrast   Final Result      No cervical spine fracture or traumatic malalignment  The study was mildly limited by patient positioning as the patient's head was turned to the right  Mild multilevel spondylitic degenerative changes of the cervical spine  Workstation performed: [id number]         CT head wo contrast   Final Result      No acute intracranial abnormality  Left frontal calcified meningioma not significant changed in size when compared to an MRI brain dated March 22, 2005  Workstation performed: [id number]             Procedures  Procedures      Phone Consults  ED Phone Contact    ED Course  ED Course as of Oct 10 1837   [facility] Oct 09, 2017   1131 CREATININE, I-STAT: (!) 6 8   1132 CREATININE, I-STAT: (!) 6 8   1329 WBC: (!) 17 24   1330 LACTIC ACID: (!!) 2 1   1330 NT-proBNP: (!) 7,285                         Initial Sepsis Screening     Row Name 10/09/17 1300                Is the patient's history suggestive of a new or worsening infection? (!)  Yes (Proceed)  -KN        Suspected source of infection suspect infection, source unknown  -KN        Are two or more of the following signs & symptoms of infection both present and new to the patient?           Indicate SIRS criteria Hyperthemia > 38 3C (100  9F); Leukocytosis (WBC > 53160 IJL); Altered mental status; Tachycardia > 90 bpm  -KN        If the answer is yes to both questions, suspicion of sepsis is present          If severe sepsis is present AND tissue hypoperfusion perists in the hour after fluid resuscitation or lactate > 4, the patient meets criteria for SEPTIC SHOCK          Are any of the following organ dysfunction criteria present within 6 hours of suspected infection and SIRS criteria that are NOT considered to be chronic conditions? (!)  Yes  -KN        Organ dysfunction Creatinine > 2 0 mg/dL; Lactate > 2 0 mmol/L  -KN        Date of presentation of severe sepsis          Time of presentation of severe sepsis          Tissue hypoperfusion persists in the hour after crystalloid fluid administration, evidenced, by either:          Was hypotension present within one hour of the conclusion of crystalloid fluid administration?           Date of presentation of septic shock          Time of presentation of septic shock            User Key  (r) = Recorded By, (t) = Taken By, (c) = Cosigned By    [address] Name Provider Type    Kaleigh Whitley MD Resident           Default Flowsheet Data (last 720 hours)      Sepsis Reassessment     Row Name 10/09/17 3826                   Volume Status and Tissue Perfusion Post Fluid Resuscitation- Must Document ALL of the Following:    Vital Signs Reviewed Yes  -KN        Cardio (!)  Tachycardia  -KN        Pulmonary Normal effort  -KN        Capillary Refill Brisk  -KN        Peripheral Pulses Radial  -KN        Peripheral Pulse +2  -KN        Skin Warm  -KN           *OR*   Intensive Monitoring- Must Document Two * of the Following Four *:    Vital Signs Reviewed Yes  -KN        * Central Venous Pressure (CVP or RAP)          * Central Venous Oxygen (SVO2, ScvO2 or Oxygen saturation via central catheter)          * Bedside Cardiovascular US in IVC diameter and % collapse          * Passive Leg Raise OR Crystalloid Challenge            User Key  (r) = Recorded By, (t) = Taken By, (c) = Cosigned By    [address] Name Provider Yaritza Nunez MD Resident                MDM  Number of Diagnoses or Management Options  Diagnosis management comments:  52-year-old  Female who comes in being confused and being found down  Unclear if patient has any trauma  Will get CT head CT C-spine CT chest abdomen pelvis  Will get blood work, and do a septic workup given patient is febrile  Will get a cardiac workup  Given history, will admit the patient to the   [facility] Time    Disposition  Final diagnoses:   Septic shock ([address] )   Hypotension     ED Disposition     None      Follow-up Information    None       Current Discharge Medication List      CONTINUE these medications which have NOT CHANGED    Details   ASPIRIN ADULT LOW DOSE PO Take by mouth      ATORVASTATIN CALCIUM PO Take by mouth      CARVEDILOL PO Take by mouth      ClonazePAM (KLONOPIN PO) Take by mouth      DULoxetine HCl (CYMBALTA PO) Take by mouth      LISINOPRIL PO Take by mouth      MELOXICAM PO Take by mouth      METFORMIN HCL PO Take by mouth      OxyMORphone HCl (OPANA PO) Take by mouth      Pregabalin (LYRICA PO) Take by mouth      RaNITidine HCl (ZANTAC PO) Take by mouth           No discharge procedures on file  ED Provider  Attending physically available and evaluated Marvin Francisca MENDEZ managed the patient along with the ED Attending      Electronically Signed by       Paige Merino MD  Resident  10/10/17 4646

## 2017-10-09 NOTE — PROGRESS NOTES
Progress Note - Critical Care   Tom Gonzales 47 y o  female MRN: [id number]  Unit/Bed#: [facility] 732-01 Encounter: [id number]    Attending Physician: Olga Hinds, DO      ______________________________________________________________________  Assessment and Plan:   Principal Problem:    Sepsis ([facility] )  Active Problems:    Altered mental status    Acute cystitis    Acute renal failure (ARF) (HCC)    Cerebral meningioma (HCC)    MARISA on CPAP    Chronic pain syndrome    DM2 (diabetes mellitus, type 2) (HCC)    Continuous opioid dependence (HCC)    KATIE (generalized anxiety disorder)    MDD (major depressive disorder)    Colon cancer ([facility] 75 )    Liver cancer ([place] [address] )    HTN (hypertension)    HLD (hyperlipidemia)    Anemia    Chronic systolic congestive heart failure (HCC)    Status post colectomy    Pyuria    Elevated brain natriuretic peptide (BNP) level  Resolved Problems:    CAD (coronary artery disease)        Neuro: ***    CV: ***    Pulm: ***    GI: ***    : ***    F/E/N: ***    ID: ***    Heme: ***    Endo: ***     Msk/Skin: ***    Disposition: Transfer to ICU    Code Status: Level 1 - Full Code      ______________________________________________________________________    Chief Complaint: AMS/Sepsis    24 Hour Events:     Patient is a 55-yo f, transfer from [facility], PMhx CHF, colon CA, MARISA on CPAP, chronic back pain, chronic opioid dependence admitted w/ AMS, Sever sepsis w/ concern for urinary source, ARF w/ Cr 6 8 and Rhabdomyolysis w/ Ck >100k   Per justin review patient was found by EMS laying in feces/urine, confused though denying any specific symptoms  Last seen the night prior  The patient was unable to recall events of the prior 12 hours  Intermittently confused on exam appears to only be oriented to self     As patient's temperature spiked to 106 5, patient was altered, patient had acute renal failure decision was made to upgrade her level of care from step-down to to critical care and she was transferred to the MICU service  Per chart review it appears that the patient had a recent hospitalization at [facility]  - 2017 for ischemic bowel for which she required an ex-lap with transverse colectomy  Records requested  She was started on Cefepime/Vanc and blood/urine cultures were sent         ______________________________________________________________________    Physical Exam:       ______________________________________________________________________  Vitals:    10/09/17 1440 10/09/17 1556 10/09/17 1822 10/09/17 1852   BP: 107/69 (!) 91/45 (!) 156/108    Pulse: (!) 112 (!) 114 (!) 156    Resp: 16 18 20    Temp:  98 2 °F (36 8 °C) (!) 103 1 °F (39 5 °C) (!) 106 5 °F (41 4 °C)   TempSrc:  Oral Oral Rectal   SpO2: 94% 91% (!) 87%        Temperature:   Temp (24hrs), Av 5 °F (39 2 °C), Min:98 2 °F (36 8 °C), Max:106 5 °F (41 4 °C)    Current Temperature: (!) 106 5 °F (41 4 °C)  Weights: There is no height or weight on file to calculate BMI  Weight (last 2 days)     None        Hemodynamic Monitoring:  N/A     Non-Invasive/Invasive Ventilation Settings:  Respiratory    Lab Data (Last 4 hours)    None         O2/Vent Data (Last 4 hours)    None              No results found for: PHART, TVF2BEA, PO2ART, UTE8UPW, Q6SWCRQT, BEART, SOURCE  SpO2: SpO2: (!) 87 %    Intake and Outputs:  I/O       10/08 0701 - 10/09 0700 10/09 0701 - 10/10 0700    P  O   0    Total Intake   0    Net   0                Intake/Output Summary (Last 24 hours) at 10/09/17 1914  Last data filed at 10/09/17 1814   Gross per 24 hour   Intake                0 ml   Output                0 ml   Net                0 ml       Nutrition:        Diet Orders            Start     Ordered    10/09/17 1707  Diet Jed/CHO Controlled; Cons Carb 2: 19-2100Kcals;  Fluid Restriction 1500 ML  Diet effective now     Question Answer Comment   Diet Type Jed/CHO Controlled    Jed/CHO Controlled Cons Carb 2: 19-2100Kcals    Other Restriction(s): Fluid Restriction 1500 ML    RD to adjust diet per protocol? Yes        10/09/17 1707          Labs:     Results from last 7 days  Lab Units 10/09/17  1123 10/09/17  1113   WBC Thousand/uL  --  17 24*   HEMOGLOBIN g/dL  --  10 8*   I STAT HEMOGLOBIN g/dl 12 2  --    HEMATOCRIT %  --  33 2*   PLATELETS Thousands/uL  --  273   MONO PCT MAN %  --  4       Results from last 7 days  Lab Units 10/09/17  1123 10/09/17  1113   SODIUM mmol/L  --  127*   POTASSIUM mmol/L  --  4 9   CHLORIDE mmol/L  --  93*   CO2 mmol/L  --  19*   BUN mg/dL  --  72*   CREATININE mg/dL  --  6 89*   CALCIUM mg/dL  --  8 8   TOTAL PROTEIN g/dL  --  7 5   BILIRUBIN TOTAL mg/dL  --  0 63   ALK PHOS U/L  --  108   ALT U/L  --  21   AST U/L  --  40   GLUCOSE RANDOM mg/dL  --  491*   GLUCOSE, ISTAT mg/dl 492*  --          No results found for: PHOS     Results from last 7 days  Lab Units 10/09/17  1113   INR  1 29*   PTT seconds 34     No results found for: TROPONINI    Results from last 7 days  Lab Units 10/09/17  1336 10/09/17  1113   LACTIC ACID mmol/L 1 6 2 1*     ABG:No results found for: PHART, ZZS2HHU, PO2ART, TCA5LCJ, X9QNFQBP, BEART, SOURCE  Imaging:   10/9 - CT head/ Cspine - no abnormality  10/09 - CAP w/o contrast - Although the study was limited by the lack of intravenous contrast, there is no gross evidence of solid organ injury  10 mm right lower lobe groundglass nodular opacity  A short-term follow-up CT chest in 3 months is recommended for further evaluation  No acute intra-abdominal abnormality  No free air or free fluid  15 mm left adrenal adenoma  I have personally reviewed pertinent reports      EKG: ***  Micro:  No results found for: Serina Paicines, WOUNDCULT, SPUTUMCULTUR  Allergies: No Known Allergies  Medications:   Scheduled Meds:    aspirin 81 mg Oral Daily   atorvastatin 20 mg Oral Daily With Dinner   [START ON 10/10/2017] cefepime 1,000 mg Intravenous Q24H   heparin (porcine) 5,000 Units Subcutaneous Q8H [address]   [START ON 10/10/2017] pantoprazole 40 mg Oral Early Morning   sodium chloride 1,000 mL Intravenous Once   vancomycin 1,000 mg Intravenous Once     Continuous Infusions:    insulin regular (HumuLIN R,NovoLIN R) infusion 0 3-21 Units/hr    sodium chloride 100 mL/hr Last Rate: 100 mL/hr (10/09/17 1828)     PRN Meds:    acetaminophen 325 mg Q4H PRN   acetaminophen 650 mg Q6H PRN   aluminum-magnesium hydroxide-simethicone 30 mL Q6H PRN   ondansetron 4 mg Q6H PRN   sodium chloride (PF) 3 mL PRN     VTE Pharmacologic Prophylaxis: Heparin  VTE Mechanical Prophylaxis: sequential compression device  Invasive lines and devices: Invasive Devices     Peripheral Intravenous Line            Peripheral IV 10/09/17 Left Antecubital less than 1 day    Peripheral IV 10/09/17 Right Antecubital less than 1 day          Drain            Urethral Catheter Straight-tip 18 Fr  less than 1 day                     Portions of the record may have been created with voice recognition software  Occasional wrong word or "sound a like" substitutions may have occurred due to the inherent limitations of voice recognition software  Read the chart carefully and recognize, using context, where substitutions have occurred      Emeka Sparks MD

## 2017-10-09 NOTE — CONSULTS
Consultation - Nephrology   Keyshawn Underwood 47 y o  female MRN: [id number]  Unit/Bed#: [facility] 498-38 Encounter: [id number]    ASSESSMENT and PLAN:  1  JORDAN: could be ATN from sepsis, fevers causing increased insensible loss, possible diarrhea, lisinopril, lasix and meloxicam on home med list and now becoming hypotensive  She was found down but her CK on admission was only 1088  Her baseline is around 1 and last creatinine was 1 04 on 10/4/17   -CT abdomen: no hydronephrosis   -UA: moderate leuks, 2+ protein, moderate blood, 2-4 RBC, innumerable WBC, innumerable bacteria, 0-3 fine granular casts    -agree with IVF: she was given a bolus in the ER and now on normal saline at 125cc/h but we will need to monitor her volume closely with hx of CHF    -will place pablo for strict I/O   -avoid hypotension: BP meds currently on hold and getting IVF    -currently with confusion and some asterixis and concerned she may need HD if no improvement with fluids   -there is a repeat BMP ordered for now so we will see if labs are worsening    -strict I/O and daily weights   2  Sepsis: possibly urinary source as UA is dirty  -urine and blood cultures pending    -on cefepime per primary team   3  Hyponatremia: sodium corrects to around 133 for glucose   -will check urine sodium and urine osm    4  Anion gap metabolic acidosis: likely due to renal failure    -also was on metformin at home    -lactic acid was high on admission and is now back to normal   5  Chronic CHF with EF 35-40%: currently no signs of volume overload but will monitor closely on IVF and off lasix   6  Hypertension: BP currently trending down and all BP meds on hold   7  DM II: metformin changed to insulin      HISTORY OF PRESENT ILLNESS:  Requesting Physician: Dontrell Boo MD  Reason for Consult:  JORDAN on CKD    Keyshawn Underwood is a 47y o  year old female who was admitted to [facility] after presenting with a fall    Patient is a poor historian and most of the history comes from the chart  She has a history of colon cancer and was recently hospitalized at [facility] for a transverse colectomy due to ischemic bowel  She was discharged August 13  She lives alone but was last seen in her normal state of health yesterday evening  When EMS found her she was lying on the ground next to her chair in urine and feces  Patient is unsure what happened but thinks that she fell and states that she could not get up  On admission she was found to be in acute renal failure with a creatinine around 7 and renal was consulted  Patient states that she did have a history of renal issues about a year ago which she thinks happened when she overdosed on naproxen  Since then she has not been taking naproxen  She thinks she may have been having some diarrhea at home last night  She denies any nausea or vomiting  She denies any chest pain or shortness of breath  She is having difficulty finding her words during our conversation and also thinks the year is 2020  PAST MEDICAL HISTORY:  Past Medical History:   Diagnosis Date    Cancer [facility])     colon    [facility]est[facility])     [facility]     Hypertension     Psychiatric disorder        PAST SURGICAL HISTORY:  Past Surgical History:   Procedure Laterality Date    COLON SURGERY         ALLERGIES:  No Known Allergies    SOCIAL HISTORY:  History   Alcohol Use No     History   Drug Use No     History   Smoking Status    Never Smoker   Smokeless Tobacco    Never Used       FAMILY HISTORY:  History reviewed  No pertinent family history      MEDICATIONS:  Scheduled Meds:  aspirin 81 mg Oral Daily   atorvastatin 20 mg Oral Daily With Dinner   [START ON 10/10/2017] cefepime 1,000 mg Intravenous Q24H   heparin (porcine) 5,000 Units Subcutaneous Q8H [address]   insulin aspart protamine-insulin aspart 60 Units Subcutaneous BID AC   insulin lispro 5-25 Units Subcutaneous TID AC   insulin lispro 5-25 Units Subcutaneous HS   [START ON 10/10/2017] pantoprazole 40 mg Oral Early Morning   pregabalin 50 mg Oral TID       PRN Meds:   acetaminophen    aluminum-magnesium hydroxide-simethicone    ondansetron    sodium chloride (PF)    Continuous Infusions:  sodium chloride 75 mL/hr       REVIEW OF SYSTEMS:  A complete review of systems was done  Pertinent positives and negatives noted in the HPI but otherwise the review of systems is negative  PHYSICAL EXAM:  Current Weight:    First Weight:    Vitals:    10/09/17 1556   BP: (!) 91/45   Pulse: (!) 114   Resp: 18   Temp: 98 2 °F (36 8 °C)   SpO2: 91%     General: no acute distress   Skin: no rash   Eyes: sclera anicteric   ENT: dry oral mucosa   Neck: supple, symmetric   Chest: decreased breath sounds   CVS: regular rate and rhythm  Abdomen:  Soft, non-tender, non-distended   Extremities: no edema  Neuro: awake but confused and having difficulty finding words, oriented to person and place but not time   +asterixis   Psych: cooperative and appropriate affect    Lab Results:   Lab Results   Component Value Date    WBC 17 24 (H) 10/09/2017    HGB 12 2 10/09/2017    HCT 33 2 (L) 10/09/2017    MCV 80 (L) 10/09/2017     10/09/2017     Lab Results   Component Value Date    GLUCOSE 492 (H) 10/09/2017    CALCIUM 8 8 10/09/2017     (L) 10/09/2017    K 4 9 10/09/2017    CO2 19 (L) 10/09/2017    CL 93 (L) 10/09/2017    BUN 72 (H) 10/09/2017    CREATININE 6 89 (H) 10/09/2017     Lab Results   Component Value Date    CALCIUM 8 8 10/09/2017

## 2017-10-09 NOTE — H&P
History and Physical - [address] [place] [address] Internal Medicine    Patient Information: Rashida Hung 47 y o  female MRN: [id number]  Unit/Bed#: MARIA LUISA Encounter: [id number]  Admitting Physician: Brittnee Moser PA-C  PCP: Luis Alfredo Lopez MD  Date of Admission:  10/09/17    Assessment/Plan:    Hospital Problem List:     Principal Problem:    Sepsis ([facility] )  Active Problems:    Altered mental status    Acute cystitis    Acute renal failure (ARF) ([place] [address] )    Cerebral meningioma ([facility] )    MARISA on CPAP    Chronic pain syndrome    DM2 (diabetes mellitus, type 2) ([place] [address] )    Continuous opioid dependence ([place] [address] )    KATIE (generalized anxiety disorder)    MDD (major depressive disorder)    Colon cancer ([place] [address] )    Liver cancer ([place] [address] )    HTN (hypertension)    HLD (hyperlipidemia)    Anemia    Chronic systolic congestive heart failure ([place] [address] )    Status post colectomy      Plan for the Primary Problem(s):  · Severe sepsis   · E/b leukocytosis, tachycardia, fever, JORDAN, AMS, LA  · Possible urinary source, ground glass opacity on CT chest   · F/u urine culture, blood cultures    · IVF hydration   · Acute renal failure likely secondary to acute rhabdo in setting of fall  · C/s nephrology   · No hydronephrosis or obstruction on Ct abdomen/pelvis  · Initially presenting with lactic acidosis now resolved  · Monitor urine output closely   · s/p 1 L in ER  Give one more liter and then start IVF at 125cc/hr   Monitor volume status closely  · Last creatinine on 10/4/17 was 1 04   · Renally dose adjust medications   · Trend BMP, ck  · Acute encephalopathy, likely toxic/metabolic in setting of sepsis and ARF  · CT head negative, stable meningioma   · Monitor cognition   · Hold sedating/altering medications including flexeril, klonopin, opana  · Bedside nursing swallow eval   · Speech eval   · Ambulatory dysfunction / mechanical fall vs syncope   · CT cervical scpine without fracture or malalignment, Ct head negative, CT c/a/p negative for gross solid organ injury,no free air or free fluid  · Check CK and r/o rhabdo as patient was found down for unknown period of time   · PT/OT  · Hyponatremia:  · NSS IVF   · In setting of acute renal failure, dehydration, sepsis, hyperglycemia    · Recheck bmp tonight  · C/s renal   Plan for Additional Problems:   · Diabetes mellitus type 2 with peripheral neuropathy: hold metformin  70/30 insulin  SSI    · HTN: hold antihypertensives in setting of sepsis  · HLD: statin   · Chronic pain syndrome: Cymbalta, prn oxycodone  opana not on form  · Continuous opioid dependence: on opana at home, prn oxycodone  · GERD: pepcid   · Chronic systolic CHF: last echo with LVEF 35-40%  Hold lasix  Monitor with IVF  · Ischemic bowel w/ history of colon adenocarcinoma s/p ex lap with transverse colectomy 7/26/17 with Dr Mone Mendes  · Morbid obesity: encourage weight loss, diet, exercise  May require bariatric bed  Supportive care  C/s nutrition  · MARISA: CPAP qHS    VTE Prophylaxis: Heparin  / sequential compression device   Code Status: FULL CODE   POLST: POLST form is not discussed and not completed at this time  Anticipated Length of Stay:  Patient will be admitted on an Inpatient basis with an anticipated length of stay of  Greater than  2 midnights  Justification for Hospital Stay: Iv abx, acute renal failure    Total Time for Visit, including Counseling / Coordination of Care: 45 minutes  Greater than 50% of this total time spent on direct patient counseling and coordination of care      Chief Complaint:   "I fell"    History of Present Illness:    Bonifacio Colbert is a 47 y o  female with PMH significant for chronic systolic congestive heart failure, cerebral meningioma, MARISA on CPAP, chronic low back pain continues opioid dependence and chronic pain syndrome, type 2 diabetes mellitus, anemia, colon cancer, liver cancer, hypertension, hyperlipidemia, general anxiety disorder, depression, recent transverse colectomy due to ischemic bowel disease, who presents with altered mental status after being found down in her home  She was last seen normal yesterday evening  Patient is still confused on my  In able to provide some history  She does not recall if he lost consciousness but states she does remember falling  She tells me that she broke 2 plastic chairs trying to get up? The patient states she was recently hospitalized at [facility]   She states she does have history of urinary tract infections  She denies urinary incontinence  She states she experience urinary frequency over the last few days  The patient was recently hospitalized from 7/22-8/13 with for exploratory laparotomy in transverse colectomy due to ischemic bowel with history of colon cancer, performed by Dr Froylan Delacruz  Patient states she usually goes to [facility] for her medical care  Patient is confused and states she feels so and she is oriented to self and location  She is a poor historian  She does not know her medications  Unsure of her past medical history  Review of Systems:    Review of Systems   Unable to perform ROS: Mental status change       Past Medical and Surgical History:     Past Medical History:   Diagnosis Date    Cancer ([facility] )     colon    CHF (congestive heart failure) ([facility] )     Diabetes mellitus ([place] [address] )     Hypertension     Psychiatric disorder        Past Surgical History:   Procedure Laterality Date    COLON SURGERY         Meds/Allergies:    Prior to Admission medications    Medication Sig Start Date End Date Taking?  Authorizing Provider   ASPIRIN ADULT LOW DOSE PO Take by mouth   Yes Historical Provider, MD   ATORVASTATIN CALCIUM PO Take by mouth   Yes Historical Provider, MD   CARVEDILOL PO Take by mouth   Yes Historical Provider, MD   ClonazePAM (KLONOPIN PO) Take by mouth   Yes Historical Provider, MD   DULoxetine HCl (CYMBALTA PO) Take by mouth   Yes Historical Provider, MD   LISINOPRIL PO Take by mouth   Yes Historical Provider, MD   MELOXICAM PO Take by mouth   Yes Historical Provider, MD   METFORMIN HCL PO Take by mouth   Yes Historical Provider, MD   OxyMORphone HCl (OPANA PO) Take by mouth   Yes Historical Provider, MD   Pregabalin (LYRICA PO) Take by mouth   Yes Historical Provider, MD   RaNITidine HCl (ZANTAC PO) Take by mouth   Yes Historical Provider, MD     I have reviewed home medications with a medical source (PCP, Pharmacy, other)  Allergies: No Known Allergies    Social History:     Marital Status: Unknown   Occupation: unknown   Patient Pre-hospital Living Situation: independently   Patient Pre-hospital Level of Mobility: no limitations   Patient Pre-hospital Diet Restrictions: none   Substance Use History:   History   Alcohol Use No     History   Smoking Status    Never Smoker   Smokeless Tobacco    Never Used     History   Drug Use No       Family History:    History reviewed  No pertinent family history  Physical Exam:     Vitals:   Blood Pressure: 107/69 (10/09/17 1440)  Pulse: (!) 112 (10/09/17 1440)  Temperature: (!) 102 1 °F (38 9 °C) (10/09/17 1102)  Temp Source: Rectal (10/09/17 1102)  Respirations: 16 (10/09/17 1440)  SpO2: 94 % (10/09/17 1440)    Physical Exam   Constitutional: She appears well-developed and well-nourished  No distress  HENT:   Head: Normocephalic    ecchymoses right forehead  MM dry   Eyes: EOM are normal  No scleral icterus  Neck: Normal range of motion  Neck supple  Cardiovascular: Regular rhythm, normal heart sounds and intact distal pulses  No murmur heard  Tachycardic rate    Pulmonary/Chest: Effort normal and breath sounds normal  No respiratory distress  She has no wheezes  She has no rales  Abdominal: Soft  Bowel sounds are normal  She exhibits no distension  There is no tenderness  There is no rebound  Morbidly obese  Midline scar  Musculoskeletal: Normal range of motion  She exhibits no edema     Moves all extremities x 4    Neurological: She is alert    Oriented to self and location   Skin: Skin is warm and dry  No significant ecchymoses of extremities, back, or buttocks    Psychiatric: She has a normal mood and affect  Nursing note and vitals reviewed  Additional Data:     Lab Results: I have personally reviewed pertinent reports  Results from last 7 days  Lab Units 10/09/17  1123 10/09/17  1113   WBC Thousand/uL  --  17 24*   HEMOGLOBIN g/dL  --  10 8*   I STAT HEMOGLOBIN g/dl 12 2  --    HEMATOCRIT %  --  33 2*   PLATELETS Thousands/uL  --  273   LYMPHO PCT %  --  2*   MONO PCT MAN %  --  4   EOSINO PCT MANUAL %  --  0       Results from last 7 days  Lab Units 10/09/17  1123 10/09/17  1113   SODIUM mmol/L  --  127*   POTASSIUM mmol/L  --  4 9   CHLORIDE mmol/L  --  93*   CO2 mmol/L  --  19*   BUN mg/dL  --  72*   CREATININE mg/dL  --  6 89*   CALCIUM mg/dL  --  8 8   TOTAL PROTEIN g/dL  --  7 5   BILIRUBIN TOTAL mg/dL  --  0 63   ALK PHOS U/L  --  108   ALT U/L  --  21   AST U/L  --  40   GLUCOSE RANDOM mg/dL  --  491*   GLUCOSE, ISTAT mg/dl 492*  --        Results from last 7 days  Lab Units 10/09/17  1113   INR  1 29*       Imaging: I have personally reviewed pertinent reports  Ct Chest Abdomen Pelvis Wo Contrast    Result Date: 10/9/2017  Narrative: CT CHEST, ABDOMEN AND PELVIS WITHOUT IV CONTRAST INDICATION:  Patient found down  Altered mental status with fever  COMPARISON: None  TECHNIQUE: CT examination of the chest, abdomen and pelvis was performed without intravenous contrast   Reformatted images were created in axial, sagittal, and coronal planes  Radiation dose length product (DLP) for this visit:  3501 19 mGy-cm   This examination, like all CT scans performed in the [facility], was performed utilizing techniques to minimize radiation dose exposure, including the use of iterative reconstruction and automated exposure control  Enteric contrast was not administered   FINDINGS: CHEST LUNGS:  There is a 10 mm right lower lobe groundglass pulmonary nodule (series 3, image 29)  There is mild bibasilar atelectasis with no focal infiltrate or pleural effusion  No pneumothorax  PLEURA:  Unremarkable  HEART/GREAT VESSELS:  Unremarkable for patient's age  MEDIASTINUM AND STEPHANIE:  Unremarkable  CHEST WALL AND LOWER NECK:       Normal  ABDOMEN LIVER/BILIARY TREE:  Unremarkable  GALLBLADDER:  No calcified gallstones  No pericholecystic inflammatory change  SPLEEN:  Unremarkable  PANCREAS:  Unremarkable  ADRENAL GLANDS:  There is a 15 mm left adrenal adenoma  KIDNEYS/URETERS:  Unremarkable  No hydronephrosis  STOMACH AND BOWEL:  There is a small sliding hiatal hernia  APPENDIX:  No findings to suggest appendicitis  ABDOMINOPELVIC CAVITY:  No ascites or free intraperitoneal air  No lymphadenopathy  VESSELS:  Unremarkable for patient's age  PELVIS REPRODUCTIVE ORGANS:  There is a 4 6 cm uterine fibroid  URINARY BLADDER:  Unremarkable  ABDOMINAL WALL/INGUINAL REGIONS:  Unremarkable  OSSEOUS STRUCTURES:  No acute fracture or destructive osseous lesion  There is thoracic levoscoliosis with lumbar dextrorotatory scoliosis  Impression: Although the study was limited by the lack of intravenous contrast, there is no gross evidence of solid organ injury  10 mm right lower lobe groundglass nodular opacity  A short-term follow-up CT chest in 3 months is recommended for further evaluation  No acute intra-abdominal abnormality  No free air or free fluid  15 mm left adrenal adenoma  ##sigslh##sigslh ##[id number]##[id number] Workstation performed: [id number]     Ct Head Wo Contrast    Result Date: 10/9/2017  Narrative: CT BRAIN - WITHOUT CONTRAST INDICATION:  Patient found down  COMPARISON:  MRI brain dated March 22, 2005  TECHNIQUE:  CT examination of the brain was performed  In addition to axial images, coronal reformatted images were created and submitted for interpretation    Radiation dose length product (DLP) for this visit:  1082 4 mGy-cm   This examination, like all CT scans performed in the [facility], was performed utilizing techniques to minimize radiation dose exposure, including the use of iterative  reconstruction and automated exposure control  IMAGE QUALITY:  Diagnostic  FINDINGS:  PARENCHYMA:  No intracranial mass, mass effect or midline shift  No CT signs of acute infarction  There is no parenchymal hemorrhage  VENTRICLES AND EXTRA-AXIAL SPACES:  There is a stable 2 4 x 2 0 cm calcified left frontal meningioma when compared to an MRI brain dated March 22, 2005  VISUALIZED ORBITS AND PARANASAL SINUSES:  Unremarkable  CALVARIUM AND EXTRACRANIAL SOFT TISSUES:   Normal      Impression: No acute intracranial abnormality  Left frontal calcified meningioma not significant changed in size when compared to an MRI brain dated March 22, 2005  Workstation performed: [id number]     Ct Spine Cervical Without Contrast    Result Date: 10/9/2017  Narrative: CT CERVICAL SPINE - WITHOUT CONTRAST INDICATION: Patient found down  COMPARISON: None  TECHNIQUE:  CT examination of the cervical spine was performed without intravenous contrast   Contiguous axial images were obtained  Sagittal and coronal reconstructions were performed  Radiation dose length product (DLP) for this visit:  1069 23 mGy-cm   This examination, like all CT scans performed in the [facility], was performed utilizing techniques to minimize radiation dose exposure, including the use of iterative reconstruction and automated exposure control  IMAGE QUALITY:  Diagnostic  FINDINGS: ALIGNMENT:  The patient's head is rotated to the right, limiting evaluation  No subluxation  VERTEBRAL BODIES:  No fracture  DEGENERATIVE CHANGES:  Mild multilevel cervical degenerative changes are noted without critical central canal stenosis  PREVERTEBRAL AND PARASPINAL SOFT TISSUES: There are a few scattered subcentimeter left thyroid lobe cystic nodules  THORACIC INLET:  Normal      Impression: No cervical spine fracture or traumatic malalignment  The study was mildly limited by patient positioning as the patient's head was turned to the right  Mild multilevel spondylitic degenerative changes of the cervical spine  Workstation performed: [id number]       EKG, Pathology, and Other Studies Reviewed on Admission:   · EKG: sinus tach     Allscripts / Epic Records Reviewed: Yes     ** Please Note: This note has been constructed using a voice recognition system   **

## 2017-10-10 ENCOUNTER — APPOINTMENT (INPATIENT)
Dept: RADIOLOGY | Facility: HOSPITAL | Age: 52
DRG: 871 | End: 2017-10-10
Payer: COMMERCIAL

## 2017-10-10 ENCOUNTER — APPOINTMENT (INPATIENT)
Dept: NON INVASIVE DIAGNOSTICS | Facility: HOSPITAL | Age: 52
DRG: 871 | End: 2017-10-10
Payer: COMMERCIAL

## 2017-10-10 LAB
ALBUMIN SERPL BCP-MCNC: 2.3 G/DL (ref 3.5–5)
ALBUMIN SERPL BCP-MCNC: 2.7 G/DL (ref 3.5–5)
ALP SERPL-CCNC: 125 U/L (ref 46–116)
ALP SERPL-CCNC: 91 U/L (ref 46–116)
ALT SERPL W P-5'-P-CCNC: 19 U/L (ref 12–78)
ALT SERPL W P-5'-P-CCNC: 23 U/L (ref 12–78)
ANION GAP SERPL CALCULATED.3IONS-SCNC: 10 MMOL/L (ref 4–13)
ANION GAP SERPL CALCULATED.3IONS-SCNC: 11 MMOL/L (ref 4–13)
ANION GAP SERPL CALCULATED.3IONS-SCNC: 11 MMOL/L (ref 4–13)
ANION GAP SERPL CALCULATED.3IONS-SCNC: 12 MMOL/L (ref 4–13)
ARTERIAL PATENCY WRIST A: YES
AST SERPL W P-5'-P-CCNC: 38 U/L (ref 5–45)
AST SERPL W P-5'-P-CCNC: 59 U/L (ref 5–45)
ATRIAL RATE: 151 BPM
BASE EXCESS BLDA CALC-SCNC: -6.2 MMOL/L
BASOPHILS # BLD MANUAL: 0 THOUSAND/UL (ref 0–0.1)
BASOPHILS NFR MAR MANUAL: 0 % (ref 0–1)
BILIRUB SERPL-MCNC: 0.44 MG/DL (ref 0.2–1)
BILIRUB SERPL-MCNC: 0.57 MG/DL (ref 0.2–1)
BUN SERPL-MCNC: 54 MG/DL (ref 5–25)
BUN SERPL-MCNC: 56 MG/DL (ref 5–25)
BUN SERPL-MCNC: 57 MG/DL (ref 5–25)
BUN SERPL-MCNC: 64 MG/DL (ref 5–25)
BUN SERPL-MCNC: 69 MG/DL (ref 5–25)
BUN SERPL-MCNC: 69 MG/DL (ref 5–25)
CALCIUM SERPL-MCNC: 6.4 MG/DL (ref 8.3–10.1)
CALCIUM SERPL-MCNC: 7.7 MG/DL (ref 8.3–10.1)
CALCIUM SERPL-MCNC: 8 MG/DL (ref 8.3–10.1)
CALCIUM SERPL-MCNC: 8.3 MG/DL (ref 8.3–10.1)
CHLORIDE SERPL-SCNC: 101 MMOL/L (ref 100–108)
CHLORIDE SERPL-SCNC: 103 MMOL/L (ref 100–108)
CHLORIDE SERPL-SCNC: 103 MMOL/L (ref 100–108)
CHLORIDE SERPL-SCNC: 108 MMOL/L (ref 100–108)
CHLORIDE SERPL-SCNC: 99 MMOL/L (ref 100–108)
CHLORIDE SERPL-SCNC: 99 MMOL/L (ref 100–108)
CK MB SERPL-MCNC: 2.7 NG/ML (ref 0–5)
CK MB SERPL-MCNC: <1 % (ref 0–2.5)
CK SERPL-CCNC: 689 U/L (ref 26–192)
CO2 SERPL-SCNC: 17 MMOL/L (ref 21–32)
CO2 SERPL-SCNC: 21 MMOL/L (ref 21–32)
CO2 SERPL-SCNC: 22 MMOL/L (ref 21–32)
CO2 SERPL-SCNC: 22 MMOL/L (ref 21–32)
CO2 SERPL-SCNC: 23 MMOL/L (ref 21–32)
CO2 SERPL-SCNC: 24 MMOL/L (ref 21–32)
CREAT SERPL-MCNC: 3.07 MG/DL (ref 0.6–1.3)
CREAT SERPL-MCNC: 3.67 MG/DL (ref 0.6–1.3)
CREAT SERPL-MCNC: 4.18 MG/DL (ref 0.6–1.3)
CREAT SERPL-MCNC: 4.28 MG/DL (ref 0.6–1.3)
CREAT SERPL-MCNC: 5.33 MG/DL (ref 0.6–1.3)
CREAT SERPL-MCNC: 5.36 MG/DL (ref 0.6–1.3)
EOSINOPHIL # BLD MANUAL: 0.15 THOUSAND/UL (ref 0–0.4)
EOSINOPHIL NFR BLD MANUAL: 2 % (ref 0–6)
ERYTHROCYTE [DISTWIDTH] IN BLOOD BY AUTOMATED COUNT: 15.1 % (ref 11.6–15.1)
ERYTHROCYTE [DISTWIDTH] IN BLOOD BY AUTOMATED COUNT: 15.3 % (ref 11.6–15.1)
EST. AVERAGE GLUCOSE BLD GHB EST-MCNC: 240 MG/DL
GFR SERPL CREATININE-BSD FRML MDRD: 11 ML/MIN/1.73SQ M
GFR SERPL CREATININE-BSD FRML MDRD: 12 ML/MIN/1.73SQ M
GFR SERPL CREATININE-BSD FRML MDRD: 14 ML/MIN/1.73SQ M
GFR SERPL CREATININE-BSD FRML MDRD: 17 ML/MIN/1.73SQ M
GFR SERPL CREATININE-BSD FRML MDRD: 9 ML/MIN/1.73SQ M
GFR SERPL CREATININE-BSD FRML MDRD: 9 ML/MIN/1.73SQ M
GLUCOSE SERPL-MCNC: 120 MG/DL (ref 65–140)
GLUCOSE SERPL-MCNC: 153 MG/DL (ref 65–140)
GLUCOSE SERPL-MCNC: 164 MG/DL (ref 65–140)
GLUCOSE SERPL-MCNC: 181 MG/DL (ref 65–140)
GLUCOSE SERPL-MCNC: 181 MG/DL (ref 65–140)
GLUCOSE SERPL-MCNC: 184 MG/DL (ref 65–140)
GLUCOSE SERPL-MCNC: 185 MG/DL (ref 65–140)
GLUCOSE SERPL-MCNC: 189 MG/DL (ref 65–140)
GLUCOSE SERPL-MCNC: 189 MG/DL (ref 65–140)
GLUCOSE SERPL-MCNC: 203 MG/DL (ref 65–140)
GLUCOSE SERPL-MCNC: 209 MG/DL (ref 65–140)
GLUCOSE SERPL-MCNC: 264 MG/DL (ref 65–140)
GLUCOSE SERPL-MCNC: 266 MG/DL (ref 65–140)
GLUCOSE SERPL-MCNC: 267 MG/DL (ref 65–140)
GLUCOSE SERPL-MCNC: 272 MG/DL (ref 65–140)
GLUCOSE SERPL-MCNC: 285 MG/DL (ref 65–140)
GLUCOSE SERPL-MCNC: 293 MG/DL (ref 65–140)
GLUCOSE SERPL-MCNC: 300 MG/DL (ref 65–140)
GLUCOSE SERPL-MCNC: 314 MG/DL (ref 65–140)
HBA1C MFR BLD: 10 % (ref 4.2–6.3)
HCO3 BLDA-SCNC: 18.6 MMOL/L (ref 22–28)
HCT VFR BLD AUTO: 27.2 % (ref 34.8–46.1)
HCT VFR BLD AUTO: 29.1 % (ref 34.8–46.1)
HGB BLD-MCNC: 8.9 G/DL (ref 11.5–15.4)
HGB BLD-MCNC: 9.5 G/DL (ref 11.5–15.4)
LACTATE SERPL-SCNC: 0.8 MMOL/L (ref 0.5–2)
LACTATE SERPL-SCNC: 1.3 MMOL/L (ref 0.5–2)
LACTATE SERPL-SCNC: 1.4 MMOL/L (ref 0.5–2)
LYMPHOCYTES # BLD AUTO: 0.29 THOUSAND/UL (ref 0.6–4.47)
LYMPHOCYTES # BLD AUTO: 4 % (ref 14–44)
MAGNESIUM SERPL-MCNC: 1.8 MG/DL (ref 1.6–2.6)
MCH RBC QN AUTO: 25.5 PG (ref 26.8–34.3)
MCH RBC QN AUTO: 25.7 PG (ref 26.8–34.3)
MCHC RBC AUTO-ENTMCNC: 32.6 G/DL (ref 31.4–37.4)
MCHC RBC AUTO-ENTMCNC: 32.7 G/DL (ref 31.4–37.4)
MCV RBC AUTO: 78 FL (ref 82–98)
MCV RBC AUTO: 79 FL (ref 82–98)
MONOCYTES # BLD AUTO: 0.07 THOUSAND/UL (ref 0–1.22)
MONOCYTES NFR BLD: 1 % (ref 4–12)
NASAL CANNULA: 6
NEUTROPHILS # BLD MANUAL: 6.75 THOUSAND/UL (ref 1.85–7.62)
NEUTS BAND NFR BLD MANUAL: 3 % (ref 0–8)
NEUTS SEG NFR BLD AUTO: 90 % (ref 43–75)
NRBC BLD AUTO-RTO: 0 /100 WBCS
O2 CT BLDA-SCNC: 14 ML/DL (ref 16–23)
OXYHGB MFR BLDA: 97.8 % (ref 94–97)
P AXIS: 70 DEGREES
PCO2 BLDA: 34.2 MM HG (ref 36–44)
PH BLDA: 7.35 [PH] (ref 7.35–7.45)
PHOSPHATE SERPL-MCNC: 4.5 MG/DL (ref 2.7–4.5)
PLATELET # BLD AUTO: 159 THOUSANDS/UL (ref 149–390)
PLATELET # BLD AUTO: 223 THOUSANDS/UL (ref 149–390)
PLATELET BLD QL SMEAR: ADEQUATE
PMV BLD AUTO: 11.2 FL (ref 8.9–12.7)
PMV BLD AUTO: 11.6 FL (ref 8.9–12.7)
PO2 BLDA: 124.3 MM HG (ref 75–129)
POTASSIUM SERPL-SCNC: 3.5 MMOL/L (ref 3.5–5.3)
POTASSIUM SERPL-SCNC: 3.6 MMOL/L (ref 3.5–5.3)
POTASSIUM SERPL-SCNC: 3.8 MMOL/L (ref 3.5–5.3)
POTASSIUM SERPL-SCNC: 3.8 MMOL/L (ref 3.5–5.3)
POTASSIUM SERPL-SCNC: 3.9 MMOL/L (ref 3.5–5.3)
POTASSIUM SERPL-SCNC: 4.1 MMOL/L (ref 3.5–5.3)
PR INTERVAL: 183 MS
PROT SERPL-MCNC: 6.3 G/DL (ref 6.4–8.2)
PROT SERPL-MCNC: 6.8 G/DL (ref 6.4–8.2)
QRS AXIS: 68 DEGREES
QRSD INTERVAL: 88 MS
QT INTERVAL: 267 MS
QTC INTERVAL: 423 MS
RBC # BLD AUTO: 3.46 MILLION/UL (ref 3.81–5.12)
RBC # BLD AUTO: 3.72 MILLION/UL (ref 3.81–5.12)
SODIUM SERPL-SCNC: 132 MMOL/L (ref 136–145)
SODIUM SERPL-SCNC: 134 MMOL/L (ref 136–145)
SODIUM SERPL-SCNC: 135 MMOL/L (ref 136–145)
SODIUM SERPL-SCNC: 136 MMOL/L (ref 136–145)
SODIUM SERPL-SCNC: 136 MMOL/L (ref 136–145)
SODIUM SERPL-SCNC: 137 MMOL/L (ref 136–145)
SPECIMEN SOURCE: ABNORMAL
T WAVE AXIS: 240 DEGREES
TSH SERPL DL<=0.05 MIU/L-ACNC: 0.17 UIU/ML (ref 0.36–3.74)
VANCOMYCIN TROUGH SERPL-MCNC: 7.4 UG/ML (ref 10–20)
VENTRICULAR RATE: 151 BPM
VIT B12 SERPL-MCNC: 247 PG/ML (ref 100–900)
WBC # BLD AUTO: 17.7 THOUSAND/UL (ref 4.31–10.16)
WBC # BLD AUTO: 7.26 THOUSAND/UL (ref 4.31–10.16)

## 2017-10-10 PROCEDURE — 80048 BASIC METABOLIC PNL TOTAL CA: CPT | Performed by: EMERGENCY MEDICINE

## 2017-10-10 PROCEDURE — 85027 COMPLETE CBC AUTOMATED: CPT | Performed by: PHYSICIAN ASSISTANT

## 2017-10-10 PROCEDURE — 83036 HEMOGLOBIN GLYCOSYLATED A1C: CPT | Performed by: PHYSICIAN ASSISTANT

## 2017-10-10 PROCEDURE — 80053 COMPREHEN METABOLIC PANEL: CPT | Performed by: INTERNAL MEDICINE

## 2017-10-10 PROCEDURE — 83605 ASSAY OF LACTIC ACID: CPT | Performed by: EMERGENCY MEDICINE

## 2017-10-10 PROCEDURE — 83735 ASSAY OF MAGNESIUM: CPT | Performed by: PHYSICIAN ASSISTANT

## 2017-10-10 PROCEDURE — 82553 CREATINE MB FRACTION: CPT | Performed by: PHYSICIAN ASSISTANT

## 2017-10-10 PROCEDURE — 82948 REAGENT STRIP/BLOOD GLUCOSE: CPT

## 2017-10-10 PROCEDURE — 82607 VITAMIN B-12: CPT | Performed by: INTERNAL MEDICINE

## 2017-10-10 PROCEDURE — 84100 ASSAY OF PHOSPHORUS: CPT | Performed by: PHYSICIAN ASSISTANT

## 2017-10-10 PROCEDURE — 80202 ASSAY OF VANCOMYCIN: CPT | Performed by: EMERGENCY MEDICINE

## 2017-10-10 PROCEDURE — 80053 COMPREHEN METABOLIC PANEL: CPT | Performed by: EMERGENCY MEDICINE

## 2017-10-10 PROCEDURE — 71010 HB CHEST X-RAY 1 VIEW FRONTAL (PORTABLE): CPT

## 2017-10-10 PROCEDURE — 85007 BL SMEAR W/DIFF WBC COUNT: CPT | Performed by: EMERGENCY MEDICINE

## 2017-10-10 PROCEDURE — 82550 ASSAY OF CK (CPK): CPT | Performed by: PHYSICIAN ASSISTANT

## 2017-10-10 PROCEDURE — 92610 EVALUATE SWALLOWING FUNCTION: CPT

## 2017-10-10 PROCEDURE — 84443 ASSAY THYROID STIM HORMONE: CPT | Performed by: PHYSICIAN ASSISTANT

## 2017-10-10 PROCEDURE — 93306 TTE W/DOPPLER COMPLETE: CPT

## 2017-10-10 PROCEDURE — 85027 COMPLETE CBC AUTOMATED: CPT | Performed by: EMERGENCY MEDICINE

## 2017-10-10 PROCEDURE — 82805 BLOOD GASES W/O2 SATURATION: CPT | Performed by: EMERGENCY MEDICINE

## 2017-10-10 PROCEDURE — 36600 WITHDRAWAL OF ARTERIAL BLOOD: CPT

## 2017-10-10 RX ORDER — POTASSIUM CHLORIDE 20 MEQ/1
40 TABLET, EXTENDED RELEASE ORAL ONCE
Status: COMPLETED | OUTPATIENT
Start: 2017-10-10 | End: 2017-10-10

## 2017-10-10 RX ORDER — SODIUM CHLORIDE, SODIUM GLUCONATE, SODIUM ACETATE, POTASSIUM CHLORIDE, MAGNESIUM CHLORIDE, SODIUM PHOSPHATE, DIBASIC, AND POTASSIUM PHOSPHATE .53; .5; .37; .037; .03; .012; .00082 G/100ML; G/100ML; G/100ML; G/100ML; G/100ML; G/100ML; G/100ML
500 INJECTION, SOLUTION INTRAVENOUS ONCE
Status: COMPLETED | OUTPATIENT
Start: 2017-10-10 | End: 2017-10-10

## 2017-10-10 RX ORDER — SODIUM CHLORIDE, SODIUM GLUCONATE, SODIUM ACETATE, POTASSIUM CHLORIDE, MAGNESIUM CHLORIDE, SODIUM PHOSPHATE, DIBASIC, AND POTASSIUM PHOSPHATE .53; .5; .37; .037; .03; .012; .00082 G/100ML; G/100ML; G/100ML; G/100ML; G/100ML; G/100ML; G/100ML
1000 INJECTION, SOLUTION INTRAVENOUS ONCE
Status: COMPLETED | OUTPATIENT
Start: 2017-10-11 | End: 2017-10-11

## 2017-10-10 RX ORDER — HEPARIN SODIUM 5000 [USP'U]/ML
7500 INJECTION, SOLUTION INTRAVENOUS; SUBCUTANEOUS EVERY 8 HOURS SCHEDULED
Status: DISCONTINUED | OUTPATIENT
Start: 2017-10-10 | End: 2017-10-13 | Stop reason: HOSPADM

## 2017-10-10 RX ORDER — SENNOSIDES 8.6 MG
1 TABLET ORAL
Status: DISCONTINUED | OUTPATIENT
Start: 2017-10-10 | End: 2017-10-13 | Stop reason: HOSPADM

## 2017-10-10 RX ORDER — SODIUM CHLORIDE 9 MG/ML
100 INJECTION, SOLUTION INTRAVENOUS CONTINUOUS
Status: DISCONTINUED | OUTPATIENT
Start: 2017-10-10 | End: 2017-10-10

## 2017-10-10 RX ORDER — SODIUM CHLORIDE, SODIUM GLUCONATE, SODIUM ACETATE, POTASSIUM CHLORIDE, MAGNESIUM CHLORIDE, SODIUM PHOSPHATE, DIBASIC, AND POTASSIUM PHOSPHATE .53; .5; .37; .037; .03; .012; .00082 G/100ML; G/100ML; G/100ML; G/100ML; G/100ML; G/100ML; G/100ML
1000 INJECTION, SOLUTION INTRAVENOUS ONCE
Status: COMPLETED | OUTPATIENT
Start: 2017-10-10 | End: 2017-10-11

## 2017-10-10 RX ORDER — FENTANYL CITRATE 50 UG/ML
100 INJECTION, SOLUTION INTRAMUSCULAR; INTRAVENOUS ONCE
Status: COMPLETED | OUTPATIENT
Start: 2017-10-10 | End: 2017-10-10

## 2017-10-10 RX ORDER — LIDOCAINE HYDROCHLORIDE 10 MG/ML
INJECTION, SOLUTION EPIDURAL; INFILTRATION; INTRACAUDAL; PERINEURAL
Status: COMPLETED
Start: 2017-10-10 | End: 2017-10-10

## 2017-10-10 RX ORDER — ACETAMINOPHEN 325 MG/1
650 TABLET ORAL EVERY 6 HOURS PRN
Status: DISCONTINUED | OUTPATIENT
Start: 2017-10-10 | End: 2017-10-13 | Stop reason: HOSPADM

## 2017-10-10 RX ORDER — POLYETHYLENE GLYCOL 3350 17 G/17G
17 POWDER, FOR SOLUTION ORAL DAILY PRN
Status: DISCONTINUED | OUTPATIENT
Start: 2017-10-10 | End: 2017-10-13 | Stop reason: HOSPADM

## 2017-10-10 RX ORDER — FENTANYL CITRATE 50 UG/ML
INJECTION, SOLUTION INTRAMUSCULAR; INTRAVENOUS
Status: COMPLETED
Start: 2017-10-10 | End: 2017-10-10

## 2017-10-10 RX ORDER — MAGNESIUM SULFATE 1 G/100ML
1 INJECTION INTRAVENOUS ONCE
Status: COMPLETED | OUTPATIENT
Start: 2017-10-10 | End: 2017-10-10

## 2017-10-10 RX ORDER — CYCLOBENZAPRINE HCL 10 MG
10 TABLET ORAL 3 TIMES DAILY PRN
Status: DISCONTINUED | OUTPATIENT
Start: 2017-10-10 | End: 2017-10-13 | Stop reason: HOSPADM

## 2017-10-10 RX ORDER — POTASSIUM CHLORIDE 20 MEQ/1
60 TABLET, EXTENDED RELEASE ORAL ONCE
Status: COMPLETED | OUTPATIENT
Start: 2017-10-10 | End: 2017-10-10

## 2017-10-10 RX ADMIN — SODIUM CHLORIDE, SODIUM GLUCONATE, SODIUM ACETATE, POTASSIUM CHLORIDE, MAGNESIUM CHLORIDE, SODIUM PHOSPHATE, DIBASIC, AND POTASSIUM PHOSPHATE 500 ML: .53; .5; .37; .037; .03; .012; .00082 INJECTION, SOLUTION INTRAVENOUS at 07:42

## 2017-10-10 RX ADMIN — POTASSIUM CHLORIDE 60 MEQ: 1500 TABLET, EXTENDED RELEASE ORAL at 14:43

## 2017-10-10 RX ADMIN — HEPARIN SODIUM 7500 UNITS: 5000 INJECTION, SOLUTION INTRAVENOUS; SUBCUTANEOUS at 13:55

## 2017-10-10 RX ADMIN — HEPARIN SODIUM 7500 UNITS: 5000 INJECTION, SOLUTION INTRAVENOUS; SUBCUTANEOUS at 22:11

## 2017-10-10 RX ADMIN — NOREPINEPHRINE BITARTRATE 11 MCG/MIN: 1 INJECTION INTRAVENOUS at 03:35

## 2017-10-10 RX ADMIN — SODIUM CHLORIDE, SODIUM GLUCONATE, SODIUM ACETATE, POTASSIUM CHLORIDE, MAGNESIUM CHLORIDE, SODIUM PHOSPHATE, DIBASIC, AND POTASSIUM PHOSPHATE 500 ML: .53; .5; .37; .037; .03; .012; .00082 INJECTION, SOLUTION INTRAVENOUS at 14:18

## 2017-10-10 RX ADMIN — SODIUM CHLORIDE, SODIUM GLUCONATE, SODIUM ACETATE, POTASSIUM CHLORIDE, MAGNESIUM CHLORIDE, SODIUM PHOSPHATE, DIBASIC, AND POTASSIUM PHOSPHATE 500 ML: .53; .5; .37; .037; .03; .012; .00082 INJECTION, SOLUTION INTRAVENOUS at 09:34

## 2017-10-10 RX ADMIN — ACETAMINOPHEN 650 MG: 325 TABLET, FILM COATED ORAL at 18:22

## 2017-10-10 RX ADMIN — SODIUM CHLORIDE 100 ML/HR: 0.9 INJECTION, SOLUTION INTRAVENOUS at 04:43

## 2017-10-10 RX ADMIN — POTASSIUM CHLORIDE 40 MEQ: 1500 TABLET, EXTENDED RELEASE ORAL at 08:04

## 2017-10-10 RX ADMIN — HEPARIN SODIUM 5000 UNITS: 5000 INJECTION, SOLUTION INTRAVENOUS; SUBCUTANEOUS at 06:11

## 2017-10-10 RX ADMIN — SODIUM CHLORIDE 6 UNITS/HR: 9 INJECTION, SOLUTION INTRAVENOUS at 08:22

## 2017-10-10 RX ADMIN — CEFEPIME HYDROCHLORIDE 1000 MG: 1 INJECTION, POWDER, FOR SOLUTION INTRAMUSCULAR; INTRAVENOUS at 10:40

## 2017-10-10 RX ADMIN — ACETAMINOPHEN 650 MG: 325 TABLET, FILM COATED ORAL at 12:33

## 2017-10-10 RX ADMIN — MAGNESIUM SULFATE HEPTAHYDRATE 1 G: 1 INJECTION, SOLUTION INTRAVENOUS at 09:38

## 2017-10-10 RX ADMIN — VASOPRESSIN 0.03 UNITS/MIN: 20 INJECTION INTRAVENOUS at 06:39

## 2017-10-10 RX ADMIN — NOREPINEPHRINE BITARTRATE 10 MCG/MIN: 1 INJECTION INTRAVENOUS at 14:37

## 2017-10-10 RX ADMIN — ACETAMINOPHEN 650 MG: 325 TABLET, FILM COATED ORAL at 08:04

## 2017-10-10 RX ADMIN — FENTANYL CITRATE 100 MCG: 50 INJECTION, SOLUTION INTRAMUSCULAR; INTRAVENOUS at 21:15

## 2017-10-10 RX ADMIN — HEPARIN SODIUM 5000 UNITS: 5000 INJECTION, SOLUTION INTRAVENOUS; SUBCUTANEOUS at 02:07

## 2017-10-10 RX ADMIN — SODIUM CHLORIDE, SODIUM GLUCONATE, SODIUM ACETATE, POTASSIUM CHLORIDE, MAGNESIUM CHLORIDE, SODIUM PHOSPHATE, DIBASIC, AND POTASSIUM PHOSPHATE 100 ML/HR: .53; .5; .37; .037; .03; .012; .00082 INJECTION, SOLUTION INTRAVENOUS at 14:57

## 2017-10-10 RX ADMIN — CEFEPIME HYDROCHLORIDE 1000 MG: 1 INJECTION, POWDER, FOR SOLUTION INTRAMUSCULAR; INTRAVENOUS at 23:58

## 2017-10-10 RX ADMIN — NOREPINEPHRINE BITARTRATE 11 MCG/MIN: 1 INJECTION INTRAVENOUS at 03:41

## 2017-10-10 RX ADMIN — ATORVASTATIN CALCIUM 20 MG: 20 TABLET, FILM COATED ORAL at 16:19

## 2017-10-10 RX ADMIN — LIDOCAINE HYDROCHLORIDE: 10 INJECTION, SOLUTION EPIDURAL; INFILTRATION; INTRACAUDAL; PERINEURAL at 23:25

## 2017-10-10 RX ADMIN — SODIUM CHLORIDE, SODIUM GLUCONATE, SODIUM ACETATE, POTASSIUM CHLORIDE, MAGNESIUM CHLORIDE, SODIUM PHOSPHATE, DIBASIC, AND POTASSIUM PHOSPHATE 1000 ML: .53; .5; .37; .037; .03; .012; .00082 INJECTION, SOLUTION INTRAVENOUS at 21:18

## 2017-10-10 RX ADMIN — SODIUM CHLORIDE, SODIUM GLUCONATE, SODIUM ACETATE, POTASSIUM CHLORIDE, MAGNESIUM CHLORIDE, SODIUM PHOSPHATE, DIBASIC, AND POTASSIUM PHOSPHATE 100 ML/HR: .53; .5; .37; .037; .03; .012; .00082 INJECTION, SOLUTION INTRAVENOUS at 08:47

## 2017-10-10 RX ADMIN — FENTANYL CITRATE 100 MCG: 50 INJECTION INTRAMUSCULAR; INTRAVENOUS at 21:15

## 2017-10-10 RX ADMIN — VASOPRESSIN 0.03 UNITS/MIN: 20 INJECTION INTRAVENOUS at 17:27

## 2017-10-10 RX ADMIN — SODIUM CHLORIDE 4 UNITS/HR: 9 INJECTION, SOLUTION INTRAVENOUS at 23:32

## 2017-10-10 RX ADMIN — LIDOCAINE HYDROCHLORIDE: 10 INJECTION, SOLUTION EPIDURAL; INFILTRATION; INTRACAUDAL; PERINEURAL at 21:15

## 2017-10-10 RX ADMIN — ASPIRIN 81 MG 81 MG: 81 TABLET ORAL at 08:05

## 2017-10-10 RX ADMIN — LIDOCAINE HYDROCHLORIDE 1 ML: 10 INJECTION, SOLUTION EPIDURAL; INFILTRATION; INTRACAUDAL; PERINEURAL at 05:32

## 2017-10-10 RX ADMIN — CYCLOBENZAPRINE HYDROCHLORIDE 10 MG: 10 TABLET, FILM COATED ORAL at 20:46

## 2017-10-10 NOTE — PLAN OF CARE
Problem: DISCHARGE PLANNING - CARE MANAGEMENT  Goal: Discharge to post-acute care or home with appropriate resources  INTERVENTIONS:  - Conduct assessment to determine patient/family and health care team treatment goals, and need for post-acute services based on payer coverage, community resources, and patient preferences, and barriers to discharge  - Address psychosocial, clinical, and financial barriers to discharge as identified in assessment in conjunction with the patient/family and health care team  - Arrange appropriate level of post-acute services according to patient's   needs and preference and payer coverage in collaboration with the physician and health care team  - Communicate with and update the patient/family, physician, and health care team regarding progress on the discharge plan  - Arrange appropriate transportation to post-acute venues  Assist pt and familt with referrals to [facility], [facility] and in pt acute rehab  Outcome: Progressing

## 2017-10-10 NOTE — PROGRESS NOTES
Upon admitting pt, pt was alert to person and place not oriented to time or situation  She became increasingly confused and not answering questions  Pt sinus tach on the monitor 's, started shivering, pt's temp was checked and was 103 axillary and BS was 454  Notified SLIM and ordered doses of Novolog 70/30 and Humalog coverage was given  Tylenol was ordered PO but pt was unable to tolerate taking oral medications  Pt's temp was 106 5 rectally had tylenol dose changed to rectal and applied ice packs to pt  Pt was then transferred to MICU

## 2017-10-10 NOTE — PROCEDURES
Central Line Insertion  Date/Time: 10/9/2017 9:46 PM  Performed by: Patty Chaudhry by: Denise Wang     Patient location:  Bedside  Other Assisting Provider: No    Consent:     Consent obtained:  Verbal    Consent given by:  Patient    Risks discussed:  Arterial puncture, incorrect placement, nerve damage, pneumothorax, infection and bleeding    Alternatives discussed:  No treatment  Universal protocol:     Patient identity confirmed:  Verbally with patient  Pre-procedure details:     Skin preparation:  2% chlorhexidine    Skin preparation agent: Skin preparation agent completely dried prior to procedure    Indications:     Central line indications: medications requiring central line and hemodynamic monitoring    Anesthesia (see MAR for exact dosages): Anesthesia method:  Local infiltration    Local anesthetic:  Lidocaine 1% w/o epi  Procedure details:     Location:  Right internal jugular    Vessel type: vein      Laterality:  Right    Approach: percutaneous technique used      Patient position:  Trendelenburg    Catheter type:  Triple lumen 16cm    Catheter size:  7 Fr    Landmarks identified: yes      Ultrasound guidance: yes      Sterile ultrasound techniques: Sterile gel and sterile probe covers were used      Number of attempts:  1    Successful placement: yes    Post-procedure details:     Post-procedure:  Dressing applied and line sutured    Assessment:  Blood return through all ports, no pneumothorax on x-ray, placement verified by x-ray and free fluid flow    Patient tolerance of procedure:   Tolerated well, no immediate complications

## 2017-10-10 NOTE — CASE MANAGEMENT
[facility] Brandyn [facility] for [facility]  Network Utilization Review Department  Phone: [phone]; Fax [phone]  ATTENTION: The Network Utilization Review Department is now centralized for our 7 Facilities  Make a note that we have a new phone and fax numbers for our Department  Please call with any questions or concerns to [phone] and carefully follow the prompts so that you are directed to the right person  All voicemails are confidential  Fax any determinations, approvals, denials, and requests for initial or continue stay review clinical to [phone]  Due to HIGH CALL volume, it would be easier if you could please send faxed requests to expedite your requests and in part, help us provide discharge notifications faster  Initial Clinical Review    Admission: Date/Time/Statement:    10/9/17 @ 1401     Orders Placed This Encounter   Procedures    Inpatient Admission (expected length of stay for this patient is greater than two midnights)     Standing Status:   Standing     Number of Occurrences:   1     Order Specific Question:   Admitting Physician     Answer:   Lachelle Luna [[id number]]     Order Specific Question:   Level of Care     Answer:   Med Surg [16]     Order Specific Question:   Estimated length of stay     Answer:   More than 2 Midnights     Order Specific Question:   Certification     Answer:   I certify that inpatient services are medically necessary for this patient for a duration of greater than two midnights  See H&P and MD Progress Notes for additional information about the patient's course of treatment       ED: Date/Time/Mode of Arrival:   ED Arrival Information     Expected Arrival Acuity Means of Arrival Escorted By Service Admission Type    - 10/9/2017 10:58 Emergent Ambulance [facility] EMS Critical Care/ICU Emergency    Arrival Complaint    altered mental status        Chief Complaint: Chief Complaint   Patient presents with    Altered Mental Status     Per EMS, pt was found down by a family friend and it is unknown when she fell  Pt was altered when EMS picked her up to bring to ED  Pt was found with urine and feces and lying on debris on the floor     Chief Complaint:   "I fell"     History of Present Illness:   Clive Berman is a 47 y o  female with PMH significant for chronic systolic congestive heart failure, cerebral meningioma, MARISA on CPAP, chronic low back pain continues opioid dependence and chronic pain syndrome, type 2 diabetes mellitus, anemia, colon cancer, liver cancer, hypertension, hyperlipidemia, general anxiety disorder, depression, recent transverse colectomy due to ischemic bowel disease, who presents with altered mental status after being found down in her home  She was last seen normal yesterday evening  Patient is still confused on my  In able to provide some history  She does not recall if he lost consciousness but states she does remember falling  She tells me that she broke 2 plastic chairs trying to get up?      The patient states she was recently hospitalized at [facility]   She states she does have history of urinary tract infections  She denies urinary incontinence  She states she experience urinary frequency over the last few days       The patient was recently hospitalized from 7/22-8/13 with for exploratory laparotomy in transverse colectomy due to ischemic bowel with history of colon cancer, performed by Dr Aggarwal Parents  Patient states she usually goes to [facility] for her medical care      Patient is confused and states she feels so and she is oriented to self and location  She is a poor historian  She does not know her medications    Unsure of her past medical history      Review of Systems:   Unable to perform ROS: Mental status change       ED Vital Signs:   ED Triage Vitals   Temperature Pulse Respirations Blood Pressure SpO2   10/09/17 1102 10/09/17 1102 10/09/17 1102 10/09/17 1107 10/09/17 1103   (!) 102 1 °F (38 9 °C) (!) 122 20 138/60 95 %      Temp Source Heart Rate Source Patient Position - Orthostatic VS BP Location FiO2 (%)   10/09/17 1102 10/09/17 1102 10/09/17 1556 10/09/17 1556 --   Rectal Monitor Lying Left arm       Pain Score       10/09/17 1237       No Pain        Wt Readings from Last 1 Encounters:   10/09/17 (!) 144 kg (316 lb 12 8 oz)       10/09 0701  10/10 0700 10/10 0701  10/10 1205  Most Recent     Temperature (°F) 106 5 98 3100  100 (37 8)    Pulse 156 100132  118    Respirations 820 1534  25    Blood Pressure 91/31156/108 123/49  123/49    Arterial Line BP 62/34134/54 102/34182/74  112/48    SpO2 (%) 87100 90100  98        LABS/Diagnostic Test Results:   CMP  Results from last 7 days  Lab Units 10/10/17  0433 10/10/17  0431 10/10/17  0207   10/09/17  1113   SODIUM mmol/L 134* 132* 137  < > 127*   POTASSIUM mmol/L 3 8 3 8 3 5  < > 4 9   CHLORIDE mmol/L 99* 99* 108  < > 93*   CO2 mmol/L 24 23 17*  < > 19*   ANION GAP mmol/L 11 10 12  < > 15*   BUN mg/dL 69* 69* 57*  < > 72*   CREATININE mg/dL 5 33* 5 36* 4 28*  < > 6 89*   EGFR ml/min/1 73sq m 9 9 11  < > 6   GLUCOSE RANDOM mg/dL 293* 285* 266*  < > 491*   GLUCOSE, ISTAT    --   --   --   < >  --    CALCIUM mg/dL 8 3 8 0* 6 4*  < > 8 8   AST U/L  --  38  --   --  40   ALT U/L  --  19  --   --  21   ALK PHOS U/L  --  91  --   --  108   TOTAL PROTEIN g/dL  --  6 8  --   --  7 5   ALBUMIN g/dL  --  2 7*  --   --  2 9*   BILIRUBIN TOTAL mg/dL  --  0 44  --   --  0 63   < > = values in this interval not displayed      CBC  Results from last 7 days  Lab Units 10/10/17  0431 10/09/17  2027 10/09/17  1123 10/09/17  1113   WBC Thousand/uL 17 70*  --   --  17 24*   HEMOGLOBIN g/dL 9 5*  --   --  10 8*   I STAT HEMOGLOBIN g/dl  --   --  12 2  --    PLATELETS Thousands/uL 223 239  --  273      Microbiology:  Procedure Component Value - Date/Time   Blood culture #1 [[id number]] Resulted: 10/10/17 0935   Lab Status: Preliminary result Specimen: Blood Updated: 10/10/17 0935    Gram Stain Result Gram positive rods     Gram negative rods   Urine culture [[id number]] Collected: 10/09/17 1115   Lab Status: Preliminary result Specimen: Urine from Urine, Clean Catch Updated: 10/10/17 0905    Urine Culture >100,000 cfu/ml Gram Negative Andrade Enteric Like   Blood culture #2 [[id number]] Collected: 10/09/17 1113   Lab Status: Preliminary result Specimen: Blood from Arm, Right Updated: 10/10/17 0934    Gram Stain Result Gram positive rods     Gram negative rods     CT HEAD _  negative for acute dx; stable left frontal meningioma      CT CHEST ABDOMEN PELVIS -  Although the study was limited by the lack of intravenous contrast, there is no gross evidence of solid organ injury  10 mm right lower lobe groundglass nodular opacity   A short-term follow-up CT chest in 3 months is recommended for further evaluation  No acute intra-abdominal abnormality   No free air or free fluid  15 mm left adrenal adenoma        ED Treatment:   Medication Administration from 10/09/2017 1057 to 10/09/2017 1532       Date/Time Order Dose Route Action Action by Comments     10/09/2017 1147 acetaminophen (TYLENOL) tablet 650 mg 650 mg Oral Given Felipa Perkins RN      10/09/2017 1200 sodium chloride 0 9 % bolus 500 mL 0 mL Intravenous Stopped Felipa Perkins RN      10/09/2017 1110 sodium chloride 0 9 % bolus 500 mL 500 mL Intravenous [address] Felipa Perkins RN      10/09/2017 1237 cefepime (MAXIPIME) 2 g/50 mL dextrose IVPB 2,000 mg Intravenous [address] Felipa Perkins RN      10/09/2017 1210 cefepime (MAXIPIME) 2 g/50 mL dextrose IVPB 0 mg Intravenous Stopped Felipa Perkins RN      10/09/2017 1346 sodium chloride 0 9 % bolus 1,000 mL 1,000 mL Intravenous New Bag Felipa Perkins RN           Past Medical/Surgical History:   Past Medical History:   Diagnosis Date    Cancer ([address] )     CHF [facility]congestive [facility] failure) [facility] [address] [facility]     Diabetes mellitus ([facility] )     Hypertension     Psychiatric disorder        Admitting Diagnosis: Altered mental status [R41 82]  Acute renal failure (ARF) ([facility] [address] ) [N17 9]    Age/Sex: 47 y o  female      Assessment/Plan:   Attestation signed by Brenda Thompson DO at 10/9/2017 6:17 PM (Updated)      The patient, last seen in her "normal state" yesterday is brought into the hospital after being found down at home  The patient can only tell me that she was in her son's bedroom (he is away at college) and she then fell  She does not remember losing conciousness but does not seem certain in her answer  The patient has no reported diarrhea or vomiting or other volume losses  She denies any cough or shortness of breath  For some reason when I ask her about urinary symptoms repeatedly, she does not answer my question about dysuria, decreased urination, etc   She does deny any neck pain, headache, photophobia, back pain, flank pain, or abdominal pain  She has not noticed any rash  The patient had a recent hospitalization at [facility] 7/22 - 8/13/2017 for ischemic bowel for which she required an ex-lap with transverse colectomy  Request made for admission of this patient with various medical issues including severe sepsis, rhabdomyolysis, and acute renal failure  On exam, the patient is awake  She has a generalized tremor  When speaking, she attempts to answer questions but must reorient herself frequently as she gets lost in her thoughts  She is oriented to person, can tell me she is at [facility] after first telling me Eliseo [place], and then can tell me it is October but can not tell me next holiday  She has coarse breath sounds bilaterally, but no rales, wheezing, or rhonchi  Breathing is unlabored       In terms of assessment and plan:  · Severe Sepsis -   ? Antibiotics - Cefepime (renal dosing) being started  ? Follow up blood cultures and urine culture    CT chest shows a right lower lobe groundglass nodular opacity, so will need to monitor for respiratory symptoms as well   ? Blood pressure support with IV fluids  Potential need for pressors if patient not able to tolerate fluids  Critical care resource team being consulted  · Acute Renal Failure with Metabolic Acidosis -   ? Seen by nephrology  Recommends transition to Level 1 Stepdown  ? We will continue on IV fluids  ? Placing pablo catheter to allow for I/O monitoring  ? Hold ACEI, NSAIDs  Avoid nephrotoxic medications  · Acute Rhabdomyolysis -   ? IV hydration  ? The patient was reported to be down for uncertain amount of time prehospital   ? Nephrology follow up  · Chronic Systolic / Diastolic Heart Failure / Cardiomyopathy -   ? Diuretic - due to hypotension, renal failure, and sepsis, hold any diuretics at this stage  ? Beta Blocker - none due to hypotension  ? ACEI / ARB - none due to hypotension and acute renal failure  ? Monitor I/O and weights especially with fluids being given  · Acute Toxic / Metabolic Encephalopathy -   ? Infection, uremia / renal failure, medication effects all potential contributors  ? Get TSH, NH3, B12   ? Determine source of sepsis and treat  ? Treat renal failure  ? Holding klonopin, flexeril, opana for first day  However, need to consider restarting at lower doses or PRN to avoid withdrawal when more stable  · Diabetes Mellitus Type 2 - hold metformin, oral meds  Insulin based regimen  · MARISA - CPAP at bedtime    · History of Ischemic Bowel - s/p ex-lap with transverse colectomy 7/2017 (Dr Steve Martin)  · Level of Care - Will change to [place] [address] Problem List:    Principal Problem:    Sepsis ([facility] [address] )    Active Problems:    Altered mental status    Acute cystitis    Acute renal failure (ARF) (HCC)    Cerebral meningioma (HCC)    MARISA on CPAP    Chronic pain syndrome    DM2 (diabetes mellitus, type 2) (HCC)    Continuous opioid dependence ([place] [address] )    KATIE (generalized anxiety disorder)    MDD (major depressive disorder)    Colon cancer ([place] [address] )    Liver cancer ([place] [address] )    HTN (hypertension)    HLD (hyperlipidemia)    Anemia    Chronic systolic congestive heart failure ([place] [address] )    Status post colectomy     Plan for the Primary Problem(s):  · Severe sepsis   ? E/b leukocytosis, tachycardia, fever, JORDAN, AMS, LA  ? Possible urinary source, ground glass opacity on CT chest   ? F/u urine culture, blood cultures    ? IVF hydration   · Acute renal failure likely secondary to acute rhabdo in setting of fall  ? C/s nephrology   ? No hydronephrosis or obstruction on Ct abdomen/pelvis  ? Initially presenting with lactic acidosis now resolved  ? Monitor urine output closely   ? s/p 1 L in ER  Give one more liter and then start IVF at 125cc/hr  Monitor volume status closely  ? Last creatinine on 10/4/17 was 1 04   ? Renally dose adjust medications   ? Trend BMP, ck  · Acute encephalopathy, likely toxic/metabolic in setting of sepsis and ARF  ? CT head negative, stable meningioma   ? Monitor cognition   ? Hold sedating/altering medications including flexeril, klonopin, opana  ? Bedside nursing swallow eval   ? Speech eval   · Ambulatory dysfunction / mechanical fall vs syncope   ? CT cervical scpine without fracture or malalignment, Ct head negative, CT c/a/p negative for gross solid organ injury,no free air or free fluid  ? Check CK and r/o rhabdo as patient was found down for unknown period of time   ? PT/OT  · Hyponatremia:  ? NSS IVF   ? In setting of acute renal failure, dehydration, sepsis, hyperglycemia    ? Recheck bmp tonight  ? C/s renal   Plan for Additional Problems:   · Diabetes mellitus type 2 with peripheral neuropathy: hold metformin  70/30 insulin  SSI    · HTN: hold antihypertensives in setting of sepsis  · HLD: statin   · Chronic pain syndrome: Cymbalta, prn oxycodone  opana not on form  · Continuous opioid dependence: on opana at home, prn oxycodone  · GERD: pepcid   · Chronic systolic CHF: last echo with LVEF 35-40%  Hold lasix  Monitor with IVF  · Ischemic bowel w/ history of colon adenocarcinoma s/p ex lap with transverse colectomy 7/26/17 with Dr Charles Skaggs  · Morbid obesity: encourage weight loss, diet, exercise  May require bariatric bed  Supportive care  C/s nutrition  · MARISA: CPAP qHS     VTE Prophylaxis: Heparin  / sequential compression device   Code Status: FULL CODE   POLST: POLST form is not discussed and not completed at this time      Anticipated Length of Stay:  Patient will be admitted on an Inpatient basis with an anticipated length of stay of  Greater than  2 midnights       Justification for Hospital Stay: Iv abx, acute renal failure      Admission Orders:  10/9/17 AT 1401  ADMIT INPATIENT TOICU  CARDIO PULM  MONITORING  ARTERIAL LINE MONITORING  VS + NEURO CHECKS PER ICU Q1HR    Up with Assistance  SCD    NPO    Continuous IV Infusions:   insulin regular (HumuLIN R,NovoLIN R) infusion 0 3-21 Units/hr Last Rate: 9 Units/hr (10/10/17 1011)   multi-electrolyte 100 mL/hr Last Rate: 100 mL/hr (10/10/17 1004)   norepinephrine 1-30 mcg/min Last Rate: 6 mcg/min (10/10/17 1045)   vasopressin (PITRESSIN) in 0 9 % sodium chloride 100 mL 0 03 Units/min Last Rate: 0 03 Units/min (10/10/17 0848)     Scheduled Meds:   aspirin 81 mg Oral Daily   atorvastatin 20 mg Oral Daily With Dinner   cefepime 1,000 mg Intravenous Q12H   heparin (porcine) 7,500 Units Subcutaneous Q8H [address]   pantoprazole 40 mg Oral Early Morning   senna 1 tablet Oral HS       PRN Meds:     Acetaminophen 325 mg q4hrs prn given x 2    Acetaminophen 650 mg q4hrs prn given x 1    aluminum-magnesium hydroxide-simethicone    cyclobenzaprine    ondansetron    polyethylene glycol    Consult Renal    Consult I+D    ECHO       Infectious Disease  Consults Date of Service: 10/10/2017  8:44 AM     IMPRESSION & RECOMMENDATIONS:     47 y o  female with recent hospitalization in July 2017 for ischemic bowel s/p ex lap with transverse colectomy admitted 10/9 after she was found down at home by EMS laying in her own feces/urine  Presented with hyperthermia up to 106 5, severe JORDAN, rhabdomyolysis       1  Septic shock with gram-negative asim bacteremia- likely urinary source  Pt still on vasopressin currently  CT of chest/abdomen/pelvis unremarkable for infection    -agree with cefepime 1g IV q24hrs  Pt received 1 dose of vancomycin 1g- we can hold additional doses  -follow-up urine cx  -follow-up blood cxs  -monitor temps, wbc count     2  Encephalopathy- likely toxic-metabolic secondary to #2  -monitor mental status  -plan as above     3  JORDAN  -will continue to renally adjust abx dosages     4   Uncontrolled diabetes  -recommend good blood glucose control in the setting of infection     Antibiotics:  Vancomycin/ Cefepime Day 2

## 2017-10-10 NOTE — PROGRESS NOTES
Accept Note - Critical Care   Estela Valero 47 y o  female MRN: [id number]  Unit/Bed#: MICU 08 Encounter: [id number]    Attending Physician: Tang High, DO      ______________________________________________________________________  Assessment and Plan:   Principal Problem:    Sepsis ([facility] )  Active Problems:    Altered mental status    Acute cystitis    Acute renal failure (ARF) (HCC)    Cerebral meningioma (HCC)    MARISA on CPAP    Chronic pain syndrome    DM2 (diabetes mellitus, type 2) (HCC)    Continuous opioid dependence (HCC)    KATIE (generalized anxiety disorder)    MDD (major depressive disorder)    Colon cancer ([place] [address] )    Liver cancer ([place] [address] )    HTN (hypertension)    HLD (hyperlipidemia)    Anemia    Chronic systolic congestive heart failure ([place] [address] )    Status post colectomy    Pyuria    Elevated brain natriuretic peptide (BNP) level  Resolved Problems:    CAD (coronary artery disease)        Neuro: AMS/Facial droop - likely related to hypotensive episode, CT head negative, mental status improved  Neurochecks q2 hours overnight  Maintain map >65  CV: Hypotension, likely secondary to septic shock, currently on Levophed/Vesopressin, titrate to maintain map >65  Check EKG/Troponin  Echo in AM     Pulm: Currently on NC, titrate to spO2 >92%, incentive spirometry     GI: NPO for now, given high vasopressor requirement    : Lobato catheter present , strict I/O to maintain UOP >0 5 ml/kg/hr    F/E/N: Bolus 3L NS total, to obtain 30 ml/kg , replete electrolyte as needed, q4 hour BMP checks given ARF    ID: Septic shock w/ elevated, likely urinary source  Lactate, hypotension, hyperthermia - ID consulted, currently on cefepime/vanco - q4 hour BMP/Lactate checks, follow up on blood cultures and urine cultures, redraw blood cultures in 48 hours   Repeat CXR in am    Heme: no acute issues    Endo: Hyperglycemia on insulin qtt to maintain glucose 140-180     Msk/Skin: Frequent offloading, no acute issues    Proph: SQH/SCDs    Disposition: Transfer to ICU    Code Status: Level 1 - Full Code      ______________________________________________________________________    Chief Complaint: Septic Shock    24 Hour Events:     55-yo f PMhx CHF, colon CA, MARISA on CPAP admitted w/ AMS, Sever sepsis w/ concern for urinary source, ARF w/ Cr 6 8  Anastasia Sinks CK 1k Per justin review patient was found by EMS laying in feces/urine, confused though denying any specific symptoms  Last seen the night prior  The patient was unable to recall events of the prior 12 hours  Per chart review it appears that the patient had a recent hospitalization at [facility] 7/22 - 8/13/2017 for ischemic bowel for which she required an ex-lap with transverse colectomy  Records requested  She was started on Cefepime/Vanc and blood/urine cultures were sent  Upon arrival to the ICU the patient was confused, Accu-Chek in the 300s, blood pressure is tolerating since the 150s, per nursing staff patient had a left-sided facial droop and stat CT head was ordered which was negative for any acute abnormality  On return back to the ICU patient was found to be hypotensive with maps in that 30s  The patient received another 2 L of normal saline and Levophed as well as vasopressin were started with map goals 65-70  Patient's mentation improved with improved blood pressure     ______________________________________________________________________    Physical Exam:     Physical Exam   Constitutional: She appears well-developed and well-nourished  No distress  Obese female in no acute respiratory distress     HENT:   Head: Normocephalic and atraumatic  Right Ear: External ear normal    Left Ear: External ear normal    Eyes: Conjunctivae are normal  Pupils are equal, round, and reactive to light  Right eye exhibits no discharge  Left eye exhibits no discharge  Neck: Normal range of motion  Neck supple  No tracheal deviation present  No thyromegaly present  Cardiovascular: Exam reveals no gallop and no friction rub     tachycardic   Pulmonary/Chest: Effort normal and breath sounds normal  No respiratory distress  She has no wheezes  She has no rales  She exhibits no tenderness  Abdominal: Soft  Bowel sounds are normal  She exhibits no distension  There is no tenderness  There is no rebound and no guarding  obese   Genitourinary:   Genitourinary Comments: Lobato in place   Musculoskeletal: She exhibits no edema, tenderness or deformity  Neurological: She is alert  Oriented times self and intermittently place, left sided facial droop resolved, MS 5/5 bilaterally UE/LE, EOMI, PERRLA   Skin: Skin is warm and dry  She is not diaphoretic  Nursing note and vitals reviewed  ______________________________________________________________________  Vitals:    10/09/17 2254 10/09/17 2256 10/09/17 2257 10/09/17 2342   BP:       Pulse: 102 100 102 94   Resp: (!) 11 (!) 11 13 (!) 10   Temp:    (!) 97 2 °F (36 2 °C)   TempSrc:       SpO2: 99% 99% 99% 99%   Weight:       Height:           Temperature:   Temp (24hrs), Av 1 °F (38 9 °C), Min:97 2 °F (36 2 °C), Max:106 5 °F (41 4 °C)    Current Temperature: (!) 97 2 °F (36 2 °C)  Weights:   IBW: 54 7 kg    Body mass index is 54 38 kg/m²  Weight (last 2 days)     Date/Time   Weight    10/09/17 1931  (!)  144 (316 8)            Hemodynamic Monitoring:  N/A     Non-Invasive/Invasive Ventilation Settings:  Respiratory    Lab Data (Last 4 hours)    None         O2/Vent Data (Last 4 hours)    None              Lab Results   Component Value Date    PHART 7 378 10/09/2017    [id number] 28 6 (LL) 10/09/2017    PO2ART 104 5 10/09/2017    [id number] 16 5 (L) 10/09/2017    BEART -7 5 10/09/2017    SOURCE Radial, Right 10/09/2017     SpO2: SpO2: 99 %  Intake and Outputs:  I/O       10/08 0701 - 10/09 0700 10/09 0701 - 10/10 0700    P  O   0    Total Intake   0    Net   0                Nutrition:        Diet Orders            Start Ordered    10/09/17 2029  Diet NPO  Diet effective now     Question Answer Comment   Diet Type NPO    RD to adjust diet per protocol? Yes        10/09/17 2028          Labs:     Results from last 7 days  Lab Units 10/09/17  2027 10/09/17  1123 10/09/17  1113   WBC Thousand/uL  --   --  17 24*   HEMOGLOBIN g/dL  --   --  10 8*   I STAT HEMOGLOBIN g/dl  --  12 2  --    HEMATOCRIT %  --   --  33 2*   PLATELETS Thousands/uL 239  --  273   MONO PCT MAN %  --   --  4       Results from last 7 days  Lab Units 10/09/17  2028 10/09/17  1123 10/09/17  1113   SODIUM mmol/L 132*  --  127*   POTASSIUM mmol/L 3 8  --  4 9   CHLORIDE mmol/L 100  --  93*   CO2 mmol/L 18*  --  19*   BUN mg/dL 73*  --  72*   CREATININE mg/dL 6 57*  --  6 89*   CALCIUM mg/dL 8 5  --  8 8   TOTAL PROTEIN g/dL  --   --  7 5   BILIRUBIN TOTAL mg/dL  --   --  0 63   ALK PHOS U/L  --   --  108   ALT U/L  --   --  21   AST U/L  --   --  40   GLUCOSE RANDOM mg/dL 381*  --  491*   GLUCOSE, ISTAT mg/dl  --  492*  --          No results found for: PHOS     Results from last 7 days  Lab Units 10/09/17  1113   INR  1 29*   PTT seconds 34     No results found for: TROPONINI    Results from last 7 days  Lab Units 10/09/17  2102 10/09/17  1336 10/09/17  1113   LACTIC ACID mmol/L 3 1* 1 6 2 1*     ABG:  Lab Results   Component Value Date    PHART 7 378 10/09/2017    [id number] 28 6 (LL) 10/09/2017    PO2ART 104 5 10/09/2017    [id number] 16 5 (L) 10/09/2017    BEART -7 5 10/09/2017    SOURCE Radial, Right 10/09/2017     Imaging: 10/9 - CT head/ Cspine - no abnormality  10/09 - CAP w/o contrast - Although the study was limited by the lack of intravenous contrast, there is no gross evidence of solid organ injury  10 mm right lower lobe groundglass nodular opacity  A short-term follow-up CT chest in 3 months is recommended for further evaluation  No acute intra-abdominal abnormality  No free air or free fluid  15 mm left adrenal adenoma    10/9 - CT head  (repeat) - no acute abnormality     I have personally reviewed pertinent reports  EKG: pending  Micro:  No results found for: Deborah Laity, WOUNDCULT, SPUTUMCULTUR  Allergies: No Known Allergies  Medications:   Scheduled Meds:    aspirin 81 mg Oral Daily   atorvastatin 20 mg Oral Daily With Dinner   cefepime 1,000 mg Intravenous Q24H   heparin (porcine) 5,000 Units Subcutaneous Q8H [address]   pantoprazole 40 mg Oral Early Morning   vancomycin 1,000 mg Intravenous Once     Continuous Infusions:    insulin regular (HumuLIN R,NovoLIN R) infusion 0 3-21 Units/hr Last Rate: 5 Units/hr (10/09/17 2145)   multi-electrolyte 100 mL/hr Last Rate: 100 mL/hr (10/09/17 2032)   norepinephrine 1-30 mcg/min Last Rate: 11 mcg/min (10/09/17 2257)   vasopressin (PITRESSIN) in 0 9 % sodium chloride 100 mL 0 03 Units/min Last Rate: 0 03 Units/min (10/09/17 2238)     PRN Meds:    acetaminophen 325 mg Q4H PRN   acetaminophen 650 mg Q6H PRN   aluminum-magnesium hydroxide-simethicone 30 mL Q6H PRN   ondansetron 4 mg Q6H PRN   sodium chloride (PF) 3 mL PRN     VTE Pharmacologic Prophylaxis: Heparin  VTE Mechanical Prophylaxis: sequential compression device  Invasive lines and devices: Invasive Devices     Central Venous Catheter Line            CVC Central Lines 10/09/17 Triple 16cm less than 1 day          Peripheral Intravenous Line            Peripheral IV 10/09/17 Left Antecubital 1 day    Peripheral IV 10/09/17 Right Antecubital less than 1 day          Arterial Line            Arterial Line 10/09/17 Right Radial less than 1 day          Drain            Urethral Catheter Straight-tip 18 Fr  less than 1 day                     Portions of the record may have been created with voice recognition software  Occasional wrong word or "sound a like" substitutions may have occurred due to the inherent limitations of voice recognition software  Read the chart carefully and recognize, using context, where substitutions have occurred      Florencio Pate MD

## 2017-10-10 NOTE — OCCUPATIONAL THERAPY NOTE
OT Cancel note:     Orders for OT evaluation received, pt is currently on pressors for BP issues, continues fever  As per RN, pt not appropriate for mobility/OOB as of yet  OT will continue to follow and evaluate as appropriate

## 2017-10-10 NOTE — MALNUTRITION/BMI
This medical record reflects one or more clinical indicators suggestive of morbid obesity  Malnutrition Findings:         BMI Findings:  50-59 9    Body mass index is 54 38 kg/m²  See Nutrition note dated 10/10/2017 for additional details  Completed nutrition assessment is viewable in the nutrition documentation

## 2017-10-10 NOTE — PROGRESS NOTES
Progress Note - Critical Care   Lanie Zimmerman 47 y o  female MRN: [id number]  Unit/Bed#: MICU 08 Encounter: [id number]    Attending Physician: Tomas Bzaan DO      ______________________________________________________________________  Assessment and Plan:   Principal Problem:    Sepsis ([place] [address] )  Active Problems:    Altered mental status    Acute cystitis    Acute renal failure (ARF) (HCC)    Cerebral meningioma (HCC)    MARISA on CPAP    Chronic pain syndrome    DM2 (diabetes mellitus, type 2) (HCC)    Continuous opioid dependence (HCC)    KATIE (generalized anxiety disorder)    MDD (major depressive disorder)    Colon cancer ([place] [address] )    Liver cancer ([place] [address] )    HTN (hypertension)    HLD (hyperlipidemia)    Anemia    Chronic systolic congestive heart failure (HCC)    Status post colectomy    Pyuria    Elevated brain natriuretic peptide (BNP) level  Resolved Problems:    CAD (coronary artery disease)        Neuro: AMS/Facial droop - 2/2 septic shock (hypotension), improved, CT head negative  Continue Q2H neurochecks  -  Chronic pain - avoid any sedating medications, tylenol PRN for pain  CV: Hypotension - 2/2 septic shock  Levo 7 mcg/min  and vaso   03 u/hr  Titrate to goal MAP > 65   -  HLD - continue home statin  -  H/o HTN - Hold home antihypertensives  -  H/o CHF - last echo with LVEF 35 - 40%  Hold lasix in setting of septic shock  Monitor volume status clinically  Echo order by internal medicine team     Pulm: MARISA - CPAP qHS, encourage IS  Monitor respiratory status  GI: GERD - protonix 40mg daily  -  H/o ischemic bowel w/ h/o colon adenocarcinoma - s/p ex lap w/ transverse colectomy 7/26/17 w/ Dr Sharyn Barney  : UTI resulting in septic shock (gm - rods) - cefepime 1g daily (day #1), monitor creatinine and up dose as improves, pablo for I/O's  -  JORDAN - creatinine improved to 5 33, BUN improving  Baseline >1  Nephrology following   q4H BMPs    F/E/N: replete lytes as necessary, bolus fluids as necessary  - 100cc/hr isolyte    ID: UTI resulting in septic shock - cefepime 1g daily (day #1), lactic improved  ID consulted  Urine culture growing enteric like gm - rods  Heme: Anemia - stable  -  Elevated CK - trending down, 2/2 fall with prolonged down time  Continue IV hydration   -  DVT proph - SCDs, subq heparin  Endo: DMII with hyperglycemia - insulin gtt (50 u/ 24 hours) for better glucose control   -  DKA - 2/2 septic shock, resolved, resolved anion gap and bicarb  Msk/Skin: Frequent turns and offloading    Disposition: ICU care  Code Status: Level 1 - Full Code    Counseling / Coordination of Care  Total time spent today 50 minutes  Greater than 50% of total time was spent with the patient and / or family counseling and / or coordination of care  A description of the counseling / coordination of care: N/A  ______________________________________________________________________    Chief Complaint: No complaints    24 Hour Events: Transferred to ICU for altered mental status and hypotension  ______________________________________________________________________    Physical Exam   Constitutional: She appears well-developed and well-nourished  No distress  Obese female in no acute respiratory distress   HENT:   Head: Normocephalic and atraumatic  Right Ear: External ear normal    Left Ear: External ear normal    Eyes: Conjunctivae are normal  Pupils are equal, round, and reactive to light  Right eye exhibits no discharge  Left eye exhibits no discharge  Neck: Normal range of motion  Neck supple  No tracheal deviation present  No thyromegaly present  Cardiovascular: Exam reveals tachycardia, no gallop and no friction rub  Pulmonary/Chest: Effort normal and breath sounds normal  No respiratory distress  She has no wheezes  She has no rales  She exhibits no tenderness  Abdominal: Soft  Bowel sounds are normal  She exhibits no distension  There is no tenderness   There is no rebound and no guarding  obese   Genitourinary:   Genitourinary Comments: Lobato in place   Musculoskeletal: She exhibits no edema, tenderness or deformity  RIJ triple lumen, L radial a-line  Neurological: She is alert  AOX3, left sided facial droop resolved, MS 5/5 bilaterally UE/LE, EOMI, PERRLA   Skin: Skin is warm and dry  She is not diaphoretic       ______________________________________________________________________  Vitals:    10/10/17 0810 10/10/17 0848 10/10/17 0900 10/10/17 0944   BP:       Pulse:  (!) 122 (!) 124 (!) 120   Resp:  15 17 16   Temp:       TempSrc:       SpO2: 100% 98% 98% 100%   Weight:       Height:           Temperature:   Temp (24hrs), Av 2 °F (37 9 °C), Min:97 2 °F (36 2 °C), Max:106 5 °F (41 4 °C)    Current Temperature: 100 °F (37 8 °C)  Weights:   IBW: 54 7 kg    Body mass index is 54 38 kg/m²  Weight (last 2 days)     Date/Time   Weight    10/09/17 1931  (!)  144 (316 8)            Hemodynamic Monitoring:  N/A     Non-Invasive/Invasive Ventilation Settings:  Respiratory    Lab Data (Last 4 hours)    None         O2/Vent Data (Last 4 hours)    None              Lab Results   Component Value Date    PHART 7 378 10/09/2017    [id number] 28 6 (LL) 10/09/2017    PO2ART 104 5 10/09/2017    [id number] 16 5 (L) 10/09/2017    BEART -7 5 10/09/2017    SOURCE Radial, Right 10/09/2017     SpO2: SpO2: 100 %  Intake and Outputs:  I/O       10/08 07 - 10/09 0700 10/09 0701 - 10/10 0700    P  O   0    I V  (mL/kg)  1405 1 (9 8)    IV Piggyback  1200    Total Intake(mL/kg)  2605 1 (18 1)    Urine (mL/kg/hr)  1450    Total Output   1450    Net   +1155 1                Nutrition:        Diet Orders            Start     Ordered    10/09/17 2029  Diet NPO  Diet effective now     Question Answer Comment   Diet Type NPO    RD to adjust diet per protocol?  Yes        10/09/17 2028          Labs:     Results from last 7 days  Lab Units 10/10/17  0431 10/09/17  2027 10/09/17  1123 10/09/17  1113   WBC Thousand/uL 17 70*  --   --  17 24*   HEMOGLOBIN g/dL 9 5*  --   --  10 8*   I STAT HEMOGLOBIN g/dl  --   --  12 2  --    HEMATOCRIT % 29 1*  --   --  33 2*   PLATELETS Thousands/uL 223 239  --  273   MONO PCT MAN %  --   --   --  4       Results from last 7 days  Lab Units 10/10/17  0433 10/10/17  0431 10/10/17  0207  10/09/17  1113   SODIUM mmol/L 134* 132* 137  < > 127*   POTASSIUM mmol/L 3 8 3 8 3 5  < > 4 9   CHLORIDE mmol/L 99* 99* 108  < > 93*   CO2 mmol/L 24 23 17*  < > 19*   BUN mg/dL 69* 69* 57*  < > 72*   CREATININE mg/dL 5 33* 5 36* 4 28*  < > 6 89*   CALCIUM mg/dL 8 3 8 0* 6 4*  < > 8 8   TOTAL PROTEIN g/dL  --  6 8  --   --  7 5   BILIRUBIN TOTAL mg/dL  --  0 44  --   --  0 63   ALK PHOS U/L  --  91  --   --  108   ALT U/L  --  19  --   --  21   AST U/L  --  38  --   --  40   GLUCOSE RANDOM mg/dL 293* 285* 266*  < > 491*   GLUCOSE, ISTAT   --   --   --   < >  --    < > = values in this interval not displayed  Results from last 7 days  Lab Units 10/10/17  0431   MAGNESIUM mg/dL 1 8     Lab Results   Component Value Date    PHOS 4 5 10/10/2017        Results from last 7 days  Lab Units 10/09/17  1113   INR  1 29*   PTT seconds 34     No results found for: TROPONINI    Results from last 7 days  Lab Units 10/10/17  0433 10/10/17  0207 10/09/17  2102   LACTIC ACID mmol/L 1 3 0 8 3 1*     ABG:  Lab Results   Component Value Date    PHART 7 378 10/09/2017    [id number] 28 6 (LL) 10/09/2017    PO2ART 104 5 10/09/2017    [id number] 16 5 (L) 10/09/2017    BEART -7 5 10/09/2017    SOURCE Radial, Right 10/09/2017     Imaging:  I have personally reviewed pertinent reports        Micro:  Lab Results   Component Value Date    URINECX >100,000 cfu/ml Gram Negative Andrade Enteric Like 10/09/2017     Allergies: No Known Allergies  Medications:   Scheduled Meds:    aspirin 81 mg Oral Daily   atorvastatin 20 mg Oral Daily With Dinner   cefepime 1,000 mg Intravenous Q24H   heparin (porcine) 7,500 Units Subcutaneous [facility] magnesium sulfate 1 g Intravenous Once   multi-electrolyte 500 mL Intravenous Once   pantoprazole 40 mg Oral Early Morning   senna 1 tablet Oral HS     Continuous Infusions:    insulin regular (HumuLIN R,NovoLIN R) infusion 0 3-21 Units/hr Last Rate: 6 Units/hr (10/10/17 6010)   multi-electrolyte 100 mL/hr Last Rate: Stopped (10/10/17 0933)   norepinephrine 1-30 mcg/min Last Rate: Stopped (10/10/17 0749)   vasopressin (PITRESSIN) in 0 9 % sodium chloride 100 mL 0 03 Units/min Last Rate: 0 03 Units/min (10/10/17 0848)     PRN Meds:    acetaminophen 325 mg Q4H PRN   acetaminophen 650 mg Q6H PRN   aluminum-magnesium hydroxide-simethicone 30 mL Q6H PRN   cyclobenzaprine 10 mg TID PRN   ondansetron 4 mg Q6H PRN   polyethylene glycol 17 g Daily PRN   sodium chloride (PF) 3 mL PRN     VTE Pharmacologic Prophylaxis: Sequential compression device (Venodyne)  and Heparin  VTE Mechanical Prophylaxis: sequential compression device  Invasive lines and devices: Invasive Devices     Central Venous Catheter Line            CVC Central Lines 10/09/17 Triple 16cm less than 1 day          Peripheral Intravenous Line            Peripheral IV 10/09/17 Right Antecubital less than 1 day          Arterial Line            Arterial Line 10/09/17 Right Radial less than 1 day    Arterial Line 10/10/17 Left Radial less than 1 day          Drain            Urethral Catheter Straight-tip 18 Fr  less than 1 day                     Portions of the record may have been created with voice recognition software  Occasional wrong word or "sound a like" substitutions may have occurred due to the inherent limitations of voice recognition software  Read the chart carefully and recognize, using context, where substitutions have occurred      Jody Jaime MD

## 2017-10-10 NOTE — PROGRESS NOTES
Progress Note - Nephrology   Lacie De Luna 47 y o  female MRN: [id number]  [facility]: [id number]    ASSESSMENT AND PLAN:  1  JORDAN:  Most compatible with prerenal azotemia/ACE-inhibitor/NSAID use with ATN from sepsis and hypoperfusion possibly mild component of rhabdomyolysis  Creatinine is slowly improving down to 5 3  Electrolytes have improved nicely as well  No need for renal placement therapy at this time  This will be a day-to-day assessment  Currently she is nonoliguric with a urine output of 1400 yesterday  Recommendations:  -urine sodium 61  -urine osmolality 337  -UA:  Innumerable WBCs/innumerable bacteria/2-4 RBCs with moderate blood  -CT without any acute intra-abdominal process but negative obstructive uropathy  Treatment:  -IV fluid treatment per primary service  -pressor support to be tapered per primary service as blood pressure improves  -treat sepsis  -monitor intake and output and labs closely  2  Metabolic acidosis:  Secondary to acute kidney injury, no evidence of further lactic acidosis, probable diabetic ketoacidosis given 2+ acetone  Treatment per primary service  Bicarbonate improved to 24   3   Hyponatremia:  Is now normal with correction for glucose 236+  4  Hypotension:  Most likely related to septicemia  TSH acceptable  Cortisol 100  Echocardiogram ordered  Pressor support with IV fluids as outlined above per primary service  5   Sepsis:?  Urosepsis  Treatment per primary service  Discussed with Critical Care Nursing at the bedside        Subjective:   Patient's overnight course included questionable left-sided facial droop with a negative CT; hypotension receiving 2 L normal saline initiation of Levophed and vasopressin  Blood pressure has improved  Urine output has picked up  She has riders at this time  Apparently her temperature went up to 105 last evening  She denies any chest pain or shortness of breath  No nausea vomiting or diarrhea    No abdominal pain at this time  Objective:     Vitals: Blood pressure (!) 123/49, pulse (!) 132, temperature 100 °F (37 8 °C), temperature source Rectal, resp  rate (!) 34, height 5' 4" (1 626 m), weight (!) 144 kg (316 lb 12 8 oz), SpO2 (!) 72 %  ,Body mass index is 54 38 kg/m²  Weight (last 2 days)     Date/Time   Weight    10/09/17 1931  (!)  144 (316 8)                Intake/Output Summary (Last 24 hours) at 10/10/17 0810  Last data filed at 10/10/17 0737   Gross per 24 hour   Intake          3026 69 ml   Output             1680 ml   Net          1346 69 ml       Urethral Catheter Straight-tip 18 Fr  (Active)   Site Assessment Clean;Skin intact; Patent 10/10/2017  7:37 AM   Collection Container Standard drainage bag 10/10/2017  7:37 AM   Securement Method Securing device (Describe) 10/10/2017  7:37 AM   Output (mL) 230 mL 10/10/2017  7:37 AM       Physical Exam: General:  Very ill-appearing with progress  Skin:  No acute rash  Eyes:  No scleral icterus  ENT:  Moist mucous membranes  Neck:  Supple, no jugular venous distention  Chest:  Clear to auscultation  CVS:  Regular rate and rhythm without rub or gallops  Abdomen:  Obese, soft and nontender with normal bowel sounds  Extremities:  Trace lower extremity edema  Neuro:  Grossly intact  Psych:  Alert, and seems oriented today                Medications:    Scheduled Meds:  aspirin 81 mg Oral Daily   atorvastatin 20 mg Oral Daily With Dinner   cefepime 500 mg Intravenous Q24H   heparin (porcine) 5,000 Units Subcutaneous Q8H [address]   magnesium sulfate 1 g Intravenous Once   multi-electrolyte 500 mL Intravenous Once   pantoprazole 40 mg Oral Early Morning   senna 1 tablet Oral HS       PRN Meds:   acetaminophen    acetaminophen    aluminum-magnesium hydroxide-simethicone    cyclobenzaprine    ondansetron    polyethylene glycol    sodium chloride (PF)    Continuous Infusions:  insulin regular (HumuLIN R,NovoLIN R) infusion 0 3-21 Units/hr Last Rate: 6 Units/hr (10/10/17 0805)   multi-electrolyte 100 mL/hr Last Rate: 100 mL/hr (10/09/17 2032)   norepinephrine 1-30 mcg/min Last Rate: Stopped (10/10/17 0749)   vasopressin (PITRESSIN) in 0 9 % sodium chloride 100 mL 0 03 Units/min Last Rate: Stopped (10/10/17 0805)       Lab, Imaging and other studies: I have personally reviewed pertinent labs    Laboratory Results:    Results from last 7 days  Lab Units 10/10/17  0433 10/10/17  0431 10/10/17  0207 10/09/17  2028 10/09/17  2027 10/09/17  1123 10/09/17  1113   WBC Thousand/uL  --  17 70*  --   --   --   --  17 24*   HEMOGLOBIN g/dL  --  9 5*  --   --   --   --  10 8*   I STAT HEMOGLOBIN g/dl  --   --   --   --   --  12 2  --    HEMATOCRIT %  --  29 1*  --   --   --   --  33 2*   PLATELETS Thousands/uL  --  223  --   --  239  --  273   SODIUM mmol/L 134* 132* 137 132*  --   --  127*   POTASSIUM mmol/L 3 8 3 8 3 5 3 8  --   --  4 9   CHLORIDE mmol/L 99* 99* 108 100  --   --  93*   CO2 mmol/L 24 23 17* 18*  --   --  19*   BUN mg/dL 69* 69* 57* 73*  --   --  72*   CREATININE mg/dL 5 33* 5 36* 4 28* 6 57*  --   --  6 89*   CALCIUM mg/dL 8 3 8 0* 6 4* 8 5  --   --  8 8   MAGNESIUM mg/dL  --  1 8  --   --   --   --   --    PHOSPHORUS mg/dL  --  4 5  --   --   --   --   --    ALBUMIN g/dL  --  2 7*  --   --   --   --  2 9*   TOTAL PROTEIN g/dL  --  6 8  --   --   --   --  7 5   GLUCOSE RANDOM mg/dL 293* 285* 266* 381*  --   --  491*   GLUCOSE, ISTAT mg/dl  --   --   --   --   --  492*  --      Urinalysis: Lab Results   Component Value Date    COLORU Yellow 10/09/2017    CLARITYU Cloudy 10/09/2017    SPECGRAV 1 020 10/09/2017    PHUR 5 5 10/09/2017    LEUKOCYTESUR Moderate (A) 10/09/2017    NITRITE Negative 10/09/2017    PROTEINUA 100 (2+) (A) 10/09/2017    GLUCOSEU 250 (1/4%) (A) 10/09/2017    KETONESU Negative 10/09/2017    BILIRUBINUR Negative 10/09/2017    BLOODU Moderate (A) 10/09/2017     ABGs: No results found for: [facility]  Radiology review:     Portions of the record may have been created with voice recognition software   Occasional wrong word or "sound a like" substitutions may have occurred due to the inherent limitations of voice recognition software   Read the chart carefully and recognize, using context, where substitutions have occurred

## 2017-10-10 NOTE — CONSULTS
Consultation - Infectious Disease   Yon Espinosa 47 y o  female MRN: [id number]  Unit/Bed#: [facility] 08 Encounter: [id number]      IMPRESSION & RECOMMENDATIONS:   Impression/Recommendations:    47 y o  female with recent hospitalization in July 2017 for ischemic bowel s/p ex lap with transverse colectomy admitted 10/9 after she was found down at home by EMS laying in her own feces/urine  Presented with hyperthermia up to 106 5, severe JORDAN, rhabdomyolysis  1  Septic shock with gram-negative asim bacteremia- likely urinary source  Pt still on vasopressin currently  CT of chest/abdomen/pelvis unremarkable for infection    -agree with cefepime 1g IV q24hrs  Pt received 1 dose of vancomycin 1g- we can hold additional doses  -follow-up urine cx  -follow-up blood cxs  -monitor temps, wbc count    2  Encephalopathy- likely toxic-metabolic secondary to #2  -monitor mental status  -plan as above    3  JORDAN  -will continue to renally adjust abx dosages    4  Uncontrolled diabetes  -recommend good blood glucose control in the setting of infection    Antibiotics:  Vancomycin/cefepime D2    Thank you for this consultation  We will follow along with you  HISTORY OF PRESENT ILLNESS:  Reason for Consult: severe sepsis    HPI: Yon Espinosa is a 47 y o  female admitted 10/9 after she was found by EMS laying in her own feces/urine, as per records  Pt is seen at bedside and is currently awake and verbal, but answering questions slowly and inappropriately  She does not recall the events of yesterday, but does know where she is now  Denies abd pain, nausea/vomiting, chest pain, shortness of breath, cough, rashes, sore throat  She had a documented fever as high as 106 5 here  Overnight, had worsening hypotension and was started on levophed/vasopressin  Pt recently underwent a transverse colectomy on 7/26/17 at [facility] due to ischemic bowel      REVIEW OF SYSTEMS:    A complete system-based review of systems is otherwise negative  PAST MEDICAL HISTORY:  Past Medical History:   Diagnosis Date    Cancer [facility])     colon    CHF (congestive heart failure) (HCC)     Diabetes mellitus ([address] )     Hypertension     Psychiatric disorder      Past Surgical History:   Procedure Laterality Date    COLON SURGERY         FAMILY HISTORY:  Non-contributory    SOCIAL HISTORY:  History   Alcohol Use No     History   Drug Use No     History   Smoking Status    Never Smoker   Smokeless Tobacco    Never Used       ALLERGIES:  No Known Allergies    MEDICATIONS:  All current active medications have been reviewed  PHYSICAL EXAM:  Vitals:  HR:  [] 132  Resp:  [8-34] 34  BP: ()/() 123/49  SpO2:  [75 %-100 %] 100 %  Temp (24hrs), Av 2 °F (37 9 °C), Min:97 2 °F (36 2 °C), Max:106 5 °F (41 4 °C)  Current: Temperature: 100 °F (37 8 °C)     Physical Exam:  General:  Ill-appearing, pale, tremulous   Eyes:  Conjunctive clear with no hemorrhages or effusions  Oropharynx:  No ulcers, no lesions  Neck:  Supple, no lymphadenopathy  Lungs:  Clear to auscultation bilaterally, no accessory muscle use  Cardiac:  tachycardic, no murmurs  Abdomen:  Obese, soft, non-tender, non-distended; Midline incision is well-healed  Extremities:  No peripheral cyanosis, clubbing, or edema  Skin:  No rashes, no ulcers  Neurological:  Moves all four extremities spontaneously, sensation grossly intact; Pt is awake, verbal, oriented to person and place, not time  Lines: RT neck CVC- clean  +pablo in place      LABS, IMAGING, & OTHER STUDIES:  Lab Results:  I have personally reviewed pertinent labs      Results from last 7 days  Lab Units 10/10/17  0433 10/10/17  0431 10/10/17  0207  10/09/17  1113   SODIUM mmol/L 134* 132* 137  < > 127*   POTASSIUM mmol/L 3 8 3 8 3 5  < > 4 9   CHLORIDE mmol/L 99* 99* 108  < > 93*   CO2 mmol/L 24 23 17*  < > 19*   ANION GAP mmol/L 11 10 12  < > 15*   BUN mg/dL 69* 69* 57*  < > 72*   CREATININE mg/dL 5 33* 5 36* 4 28*  < > 6 89*   EGFR ml/min/1 73sq m 9 9 11  < > 6   GLUCOSE RANDOM mg/dL 293* 285* 266*  < > 491*   GLUCOSE, ISTAT   --   --   --   < >  --    CALCIUM mg/dL 8 3 8 0* 6 4*  < > 8 8   AST U/L  --  38  --   --  40   ALT U/L  --  19  --   --  21   ALK PHOS U/L  --  91  --   --  108   TOTAL PROTEIN g/dL  --  6 8  --   --  7 5   ALBUMIN g/dL  --  2 7*  --   --  2 9*   BILIRUBIN TOTAL mg/dL  --  0 44  --   --  0 63   < > = values in this interval not displayed  Results from last 7 days  Lab Units 10/10/17  0431 10/09/17  2027 10/09/17  1123 10/09/17  1113   WBC Thousand/uL 17 70*  --   --  17 24*   HEMOGLOBIN g/dL 9 5*  --   --  10 8*   I STAT HEMOGLOBIN g/dl  --   --  12 2  --    PLATELETS Thousands/uL 223 239  --  273         Imaging Studies:   I have personally reviewed pertinent imaging study reports and images in PACS    CTH: negative for acute dx; stable left frontal meningioma  CT C/A/P: no acute disease

## 2017-10-10 NOTE — PLAN OF CARE
Problem: DISCHARGE PLANNING - CARE MANAGEMENT  Goal: Discharge to post-acute care or home with appropriate resources  INTERVENTIONS:  - Conduct assessment to determine patient/family and health care team treatment goals, and need for post-acute services based on payer coverage, community resources, and patient preferences, and barriers to discharge  - Address psychosocial, clinical, and financial barriers to discharge as identified in assessment in conjunction with the patient/family and health care team  - Arrange appropriate level of post-acute services according to patient's   needs and preference and payer coverage in collaboration with the physician and health care team  - Communicate with and update the patient/family, physician, and health care team regarding progress on the discharge plan  - Arrange appropriate transportation to post-acute venues  Assist pt and familt with referrals to [place] [id number], [address] [place] [address] and in  acute rehab   INTERVENTIONS:  - Conduct assessment to determine patient/family and health care team treatment goals, and need for post-acute services based on payer coverage, community resources, and patient preferences, and barriers to discharge  - Address psychosocial, clinical, and financial barriers to discharge as identified in assessment in conjunction with the patient/family and health care team  - Arrange appropriate level of post-acute services according to patient's   needs and preference and payer coverage in collaboration with the physician and health care team  - Communicate with and update the patient/family, physician, and health care team regarding progress on the discharge plan  - Arrange appropriate transportation to post-acute venues  Assist pt and family with referrals to [place] [id number], [facility] and in pt acute rehab  Outcome: Progressing

## 2017-10-10 NOTE — SPEECH THERAPY NOTE
Speech Language/Pathology  Speech/Language Pathology  Assessment    Patient Name: Chante Ramirez  AIFZS'E Date: 10/10/2017     Problem List  Patient Active Problem List   Diagnosis    Altered mental status    Sepsis ([facility] )    Acute cystitis    Acute renal failure (ARF) ([facility] )    Cerebral meningioma ([facility] )    MARISA on CPAP    Chronic pain syndrome    DM2 (diabetes mellitus, type 2) ([place] [address] )    Continuous opioid dependence ([place] [address] )    KATIE (generalized anxiety disorder)    MDD (major depressive disorder)    Colon cancer ([place] [address] )    Liver cancer ([place] [address] )    HTN (hypertension)    HLD (hyperlipidemia)    Anemia    Chronic systolic congestive heart failure (HCC)    Status post colectomy    Pyuria    Elevated brain natriuretic peptide (BNP) level     Past Medical History  Past Medical History:   Diagnosis Date    Cancer ([place] [address] )     colon    CHF (congestive heart failure) ([facility] )     Diabetes mellitus ([place] [address] )     Hypertension     Psychiatric disorder      Past Surgical History  Past Surgical History:   Procedure Laterality Date    COLON SURGERY           Per H&P:     Date of Service: 10/9/2017  3:11 PM      Attestation signed by Romayne Rink, DO at 10/9/2017 6:17 PM (Updated)   I have seen and examined Chante Ramirez personally and have reviewed the medical record independently  I have reviewed the case with the advanced practitioner including all assessments and the plan of care for each  I agree with the advanced practitioner and offer the following addendum to the below statements by the advanced practitioner:      The patient, last seen in her "normal state" yesterday is brought into the hospital after being found down at home  The patient can only tell me that she was in her son's bedroom (he is away at college) and she then fell  She does not remember losing conciousness but does not seem certain in her answer  The patient has no reported diarrhea or vomiting or other volume losses    She denies any cough or shortness of breath  For some reason when I ask her about urinary symptoms repeatedly, she does not answer my question about dysuria, decreased urination, etc   She does deny any neck pain, headache, photophobia, back pain, flank pain, or abdominal pain  She has not noticed any rash  The patient had a recent hospitalization at [facility] 7/22 - 8/13/2017 for ischemic bowel for which she required an ex-lap with transverse colectomy  Request made for admission of this patient with various medical issues including severe sepsis, rhabdomyolysis, and acute renal failure  On exam, the patient is awake  She has a generalized tremor  When speaking, she attempts to answer questions but must reorient herself frequently as she gets lost in her thoughts  She is oriented to person, can tell me she is at [facility] after first telling me Micheal Gabeena, and then can tell me it is October but can not tell me next holiday  She has coarse breath sounds bilaterally, but no rales, wheezing, or rhonchi  Breathing is unlabored       In terms of assessment and plan:  · Severe Sepsis -   ? Antibiotics - Cefepime (renal dosing) being started  ? Follow up blood cultures and urine culture  CT chest shows a right lower lobe groundglass nodular opacity, so will need to monitor for respiratory symptoms as well   ? Blood pressure support with IV fluids  Potential need for pressors if patient not able to tolerate fluids  Critical care resource team being consulted  · Acute Renal Failure with Metabolic Acidosis -   ? Seen by nephrology  Recommends transition to Level 1 Stepdown  ? We will continue on IV fluids  ? Placing pablo catheter to allow for I/O monitoring  ? Hold ACEI, NSAIDs  Avoid nephrotoxic medications  · Acute Rhabdomyolysis -   ? IV hydration  ? The patient was reported to be down for uncertain amount of time prehospital   ? Nephrology follow up    · Chronic Systolic / Diastolic Heart Failure / Cardiomyopathy -   ? Diuretic - due to hypotension, renal failure, and sepsis, hold any diuretics at this stage  ? Beta Blocker - none due to hypotension  ? ACEI / ARB - none due to hypotension and acute renal failure  ? Monitor I/O and weights especially with fluids being given  · Acute Toxic / Metabolic Encephalopathy -   ? Infection, uremia / renal failure, medication effects all potential contributors  ? Get TSH, NH3, B12   ? Determine source of sepsis and treat  ? Treat renal failure  ? Holding klonopin, flexeril, opana for first day  However, need to consider restarting at lower doses or PRN to avoid withdrawal when more stable  · Diabetes Mellitus Type 2 - hold metformin, oral meds  Insulin based regimen  · MARISA - CPAP at bedtime  · History of Ischemic Bowel - s/p ex-lap with transverse colectomy 7/2017 (Dr Artemio Owens)  · Level of Care - Will change to Critical Care Service of Dr Madalyn Tony - Spoke with Dr Rhiannon Coffey  For detailed history, assessment, and plan of care, please review the statements below by James Farmer, DO      Expand All Collapse All          Chief Complaint:   "I fell"  History of Present Illness:  Jocelyn Balderas is a 47 y o  female with PMH significant for chronic systolic congestive heart failure, cerebral meningioma, MARISA on CPAP, chronic low back pain continues opioid dependence and chronic pain syndrome, type 2 diabetes mellitus, anemia, colon cancer, liver cancer, hypertension, hyperlipidemia, general anxiety disorder, depression, recent transverse colectomy due to ischemic bowel disease, who presents with altered mental status after being found down in her home  She was last seen normal yesterday evening  Patient is still confused on my  In able to provide some history  She does not recall if he lost consciousness but states she does remember falling  She tells me that she broke 2 plastic chairs trying to get up?    The patient states she was recently hospitalized at [facility]   She states she does have history of urinary tract infections  She denies urinary incontinence  She states she experience urinary frequency over the last few days  The patient was recently hospitalized from 7/22-8/13 with for exploratory laparotomy in transverse colectomy due to ischemic bowel with history of colon cancer, performed by Dr Wendy Lynch  Patient states she usually goes to [facility] for her medical care  Patient is confused and states she feels so and she is oriented to self and location  She is a poor historian  She does not know her medications  Unsure of her past medical history  Reason for consult:  R/o aspiration  Determine safest and least restrictive diet  Change in mental status  Current diet:  npo  Premorbid diet[de-identified]  regular  Previous VBS:  none  O2 requirement:  nc  Voice/Speech:  Voicing is normal    Social:  Home  States he son is at [employer] at [employer]  Follows commands:  Inconsistent  Very distractable  Cognitive Status:  Tangential  Some word-finsing errors and substitutions noted  ? Due to sepsis as CT head was neg acute  Oral mech exam:  Natural dentition  Full symmetry         Items administered: Toast, thin liquids  Oral stage:  wnl  Pharyngeal stage:  wnl  No s/s aspiration    Esophageal stage:  No s/s reported    Summary:  Pt presents w/  Confusion but was able to feed self and chew and swallow w/out s/s difficulty or aspiration       Recommendations:  Diet: regular  Liquid:thin  Meds:as tolerated  Positioning:Upright  Aspiration precautions    Results d/w:  Pt, nurse    Justin only

## 2017-10-10 NOTE — MEDICAL STUDENT
Ms Cat Huntley is a 47 yoF with a PMHx of colon cancer (1990s), CHF, chronic pain who presented with AMS and was found to have severe sepsis and acute renal failure with creatinine 6 8  She was found by EMS at home laying in feces/urine, confused, unable to remember past 12 hours  Recently hospitalized at [facility] 7/22-8/13 for ischemic bowel s/p ex lap and transverse colectomy, then hospitalized 8/16-8/24 for CHF exacerbation  On admission BG was 450s, AG 14, bicarb 18  Last night she was noted to have L sided facial droop with negative CTH  She then became hypotensive and received 2L NS, and was started on levophed and vasopressin  Se also received albumin  Overnight her vitals were labile, with her temperature ranging from 97 2 to 106 and MAPs from the 40s to 100s  This morning she is complaining of migraine and back pain, and is shivering  She continues to be somewhat confused and is unable to fully recall what happened to her except to repeat multiple times that she fell on the way to the bathroom in the middle of the night  She received 1L bolus isolyte in addition to the drip  PMHx: colon cancer s/p R hemicolectomy and chemo (1999 per records), cerebral meningioma, CHF, MARISA on CPAP, HTN, HLD, T2DM, chronic pain on opioids, KATIE, MDD    VS: Tm 106 5 (7pm) -> hypothermic O/N to 97 2 then climbing to 103, HR 80s-150s, RR 8-34, A line BP 60s-180s/30s-60s (MAP 40s-100s), O2 87 on RA ->  on 4L NC    I/O: 2600/1450 -> +1 1L    PE:  Gen: in some distress, rigors  HEENT: dry MM  CV: tachycardic but regular  Pulm: clear  Abd: decreased BS, soft, slightly tender on L side  MSK: +pulses, some edema non-pitting  Neuro: GCS 15, confused, oriented to person and place but not time (first states year is "[facility]" then "1970  [place]  1970    ")   Perseverates    Labs:  134/ 99/ 69 AG 11, Ca 8 3, alb 2 7, AST/ALT/AP nl  3 8/ 24/ 5 33 T bili nl, phos 4 5, Mg 1 8  Adm: Na 132, bicarb 18, AG 14, BUN 73, creat 6 57    WBC 17 7 / Hgb 9 5 / Plts 223 / MCV 78   (adm 1088), lactic acid 1 3 (3 1 on adm)  Pro-BNP 7285 adm    A1c 10, TSH 0 170  Coags PT 16 2, PTT 34, INR 1 29  Acetone 2+  UA: +glu, blood, leukocytes, prot, WBC, bact  UCx >100k GNR  BCx GNR, GPR    Imaging:  10/9 EKG sinus tach  10/9 CT C/A/P: no acute intra-abdom abnorm, 15mm L adrenal adenoma, 10mm RLL nodular opacity  10/9 CT C spine: no fx, mild degen chg, scattered tiny L thyroid cystic nodules  10/9 1pm CTH: no mass, acute infarct/hemorr, stable L frontal meningioma (dating to 2005)   10/9 8pm CTH: no change  CXR: pending    Meds: ASA 81, atorvastatin 20, protonix 40 PO, heparin 5000U  Today: K Cl 40mEq, Mg sulfate 1g  Abx: got 1 dose of vanco, on cefepime 1g daily (day 2)    Drips: insulin @ 9, isolyte @ 100/hr, levo @ 15, vasopressin @ 0 03    Lines: central line (R IJ), periph R antecub, L radial A line    A: septic shock likely 2/2 UTI, AG metabolic acidosis - appears resolved, T2DM with hyperglycemia, CHF, JORDAN, AMS, facial droop - resolved, lactic acidosis vs DKA     P:  Neuro:  -AMS/facial droop: negative CTH; delirium precautions  -chronic pain: Tylenol, flexeril prn    CV:   -septic shock: continue pressors, MAP goal >70; hold anti-hypertensives, hold lasix  Cefepime day 2  -CHF: F/U echo (previous with EF 35-40)    Pulm:  -F/U CXR  -MARISA with CPAP use: continue CPAP at night    GI: NPO, protonix 40 PO    FEN:  -hypoNa: corrects to137 (glucose 293)  -on isolyte @ 054/VJ  -AG metabolic acidosis: lactic acidosis resolved, AG normalized, bicarb normalized  2+ acetone on admission, BG 450s -> ? DKA  Continue insulin drip, IVF    :  -JORDAN: Baseline creat 0 8-1  Hold lisinopril/metformin; continue IVF     -UTI: F/U UCx, BCx  -elevated CK: downtrending, continue IVF    ID:  -UTI/septic shock: continue cefepime 1g daily (day 2)  -ID c/s pending    Heme: anemia at 9 5, monitor, transfuse if <7  -per records, Hgb 11 in August but low iron/iron sat, high transferrin/TIBC -> likely Fe-deficiency anemia  -ppx: heparin, SCDs    Endo:   -uncontrolled T2DM with hyperglycemia: continue insulin drip, IVF  -low TSH: recheck as outpatient  -random cortisol 100    MSK/skin:  -continue ulcer ppx

## 2017-10-10 NOTE — SOCIAL WORK
CM met pt at bedside and made aware of CM role at discharge  Pt noted to be awake and alert but noted to be confused and repetitive with her answers at times  Pt reported that she lives in a bi level apt alone  Pt's son lives with her in the apt prior to going to [facility] in [facility]  There are 2 TJ and 12 steps to the 2nd level  Pt denies having a LW and POA  pt reported that she was IPTA, drive and in disability  Pt's DME's are a walker, cane, SC, BC  Preferred pharmacy is [facility] in [place], [place]  Pt has hx with [facility]HC but does not remember the name of the facility  Pt denies hx with STR, alc and drug tx  Pt reported that she goes to [facility] once every 3 mos and follow up with Dr Hoda Jo  CM called pts mother Tea Casillas -[phone] to confirm the above reports  Tea Casillas who is in [facility] confirmed that all the above data are correct exept that she is the pt's POA and that she has the [facility] papers  CM will follow

## 2017-10-10 NOTE — WOUND OSTOMY CARE
Progress Note - Wound   Donald Christianity 47 y o  female MRN: [id number]  Unit/Bed#: MICU 08 Encounter: [id number]      Assessment: Patient is seen for skin and wound care assessment   The patient is a 47year old female that was found down at home   The patient was admitted with severe sepsis, acute renal failure , rhabdomyolysis , cardiac myopathy, acute toxic encephalopathy , DM , MARISA , and ischemic bowel colectomy in the past   The patient is seen at the bedside and is alert but confused and repetitive with her answers   The patient has a BMI of  54 38 and has several folds and creases   Sacral area has linear partial thickness area in the slit related to moisture   Note old scarring in the the area   The patient states that this does open at the top  All other folds of abdomen , back , breast assessed and intact   Bilateral heels intact   Discussed plan of care with the RN   Plan:   1  Dust with stomahesive powder then apply calazime tid and prn   2  Apply skin nourishing cream to the skin daily   3  Apply hydraguard bid and prn to bilateral heels   4  Turn and reposition every 2 hours to offload and prevent pressure   5  Elevate heels off of the bed with pillows   6  Soft care cushion when OOB in the chair         Objective:    Vitals: Blood pressure (!) 123/49, pulse (!) 112, temperature (!) 101 6 °F (38 7 °C), temperature source Rectal, resp  rate (!) 23, height 5' 4" (1 626 m), weight (!) 144 kg (316 lb 12 8 oz), SpO2 94 %  ,Body mass index is 54 38 kg/m²  Wound 10/10/17 Moisture associated skin damage Sacrum Inner;Mid Linear - partial thickness slit related to moisture  (Active)   Wound Description Clean;Fragile;Pink 10/10/2017  5:00 PM   Tara-wound Assessment Clean;Fragile; Maceration 10/10/2017  5:00 PM   Shape linear  10/10/2017  5:00 PM   Wound Length (cm) 1 5 cm 10/10/2017  5:00 PM   Wound Width (cm) 0 1 cm 10/10/2017  5:00 PM   Wound Depth (cm) 0 1 10/10/2017  5:00 PM   Calculated Wound Volume (cm^3) 0 02 cm^3 10/10/2017  5:00 PM   Drainage Amount Scant 10/10/2017  5:00 PM   Drainage Description Bloody 10/10/2017  5:00 PM   Non-staged Wound Description Partial thickness 10/10/2017  5:00 PM   Treatments Cleansed 10/10/2017  5:00 PM   Dressing Protective barrier 10/10/2017  5:00 PM   Patient Tolerance Tolerated well 10/10/2017  5:00 PM         Will follow weekly for wound care     Christa Blunt RN BSN Migel Adams

## 2017-10-11 LAB
ANION GAP SERPL CALCULATED.3IONS-SCNC: 12 MMOL/L (ref 4–13)
ANION GAP SERPL CALCULATED.3IONS-SCNC: 9 MMOL/L (ref 4–13)
ATRIAL RATE: 103 BPM
BACTERIA UR CULT: ABNORMAL
BUN SERPL-MCNC: 50 MG/DL (ref 5–25)
BUN SERPL-MCNC: 50 MG/DL (ref 5–25)
CA-I BLD-SCNC: 1.03 MMOL/L (ref 1.12–1.32)
CALCIUM SERPL-MCNC: 7.4 MG/DL (ref 8.3–10.1)
CALCIUM SERPL-MCNC: 7.8 MG/DL (ref 8.3–10.1)
CHLORIDE SERPL-SCNC: 102 MMOL/L (ref 100–108)
CHLORIDE SERPL-SCNC: 103 MMOL/L (ref 100–108)
CK MB SERPL-MCNC: 2.7 NG/ML (ref 0–5)
CK MB SERPL-MCNC: <1 % (ref 0–2.5)
CK SERPL-CCNC: 946 U/L (ref 26–192)
CO2 SERPL-SCNC: 23 MMOL/L (ref 21–32)
CO2 SERPL-SCNC: 24 MMOL/L (ref 21–32)
CREAT SERPL-MCNC: 2.65 MG/DL (ref 0.6–1.3)
CREAT SERPL-MCNC: 2.92 MG/DL (ref 0.6–1.3)
ERYTHROCYTE [DISTWIDTH] IN BLOOD BY AUTOMATED COUNT: 15.6 % (ref 11.6–15.1)
GFR SERPL CREATININE-BSD FRML MDRD: 18 ML/MIN/1.73SQ M
GFR SERPL CREATININE-BSD FRML MDRD: 20 ML/MIN/1.73SQ M
GLUCOSE SERPL-MCNC: 113 MG/DL (ref 65–140)
GLUCOSE SERPL-MCNC: 128 MG/DL (ref 65–140)
GLUCOSE SERPL-MCNC: 132 MG/DL (ref 65–140)
GLUCOSE SERPL-MCNC: 137 MG/DL (ref 65–140)
GLUCOSE SERPL-MCNC: 140 MG/DL (ref 65–140)
GLUCOSE SERPL-MCNC: 149 MG/DL (ref 65–140)
GLUCOSE SERPL-MCNC: 159 MG/DL (ref 65–140)
GLUCOSE SERPL-MCNC: 160 MG/DL (ref 65–140)
GLUCOSE SERPL-MCNC: 167 MG/DL (ref 65–140)
GLUCOSE SERPL-MCNC: 176 MG/DL (ref 65–140)
GLUCOSE SERPL-MCNC: 178 MG/DL (ref 65–140)
GLUCOSE SERPL-MCNC: 196 MG/DL (ref 65–140)
GLUCOSE SERPL-MCNC: 216 MG/DL (ref 65–140)
GLUCOSE SERPL-MCNC: 226 MG/DL (ref 65–140)
HCT VFR BLD AUTO: 27.2 % (ref 34.8–46.1)
HGB BLD-MCNC: 9 G/DL (ref 11.5–15.4)
MAGNESIUM SERPL-MCNC: 2.1 MG/DL (ref 1.6–2.6)
MCH RBC QN AUTO: 25.6 PG (ref 26.8–34.3)
MCHC RBC AUTO-ENTMCNC: 33.1 G/DL (ref 31.4–37.4)
MCV RBC AUTO: 78 FL (ref 82–98)
P AXIS: 42 DEGREES
PHOSPHATE SERPL-MCNC: 2.7 MG/DL (ref 2.7–4.5)
PLATELET # BLD AUTO: 173 THOUSANDS/UL (ref 149–390)
PMV BLD AUTO: 11 FL (ref 8.9–12.7)
POTASSIUM SERPL-SCNC: 3.4 MMOL/L (ref 3.5–5.3)
POTASSIUM SERPL-SCNC: 4.1 MMOL/L (ref 3.5–5.3)
PR INTERVAL: 129 MS
QRS AXIS: 40 DEGREES
QRSD INTERVAL: 83 MS
QT INTERVAL: 363 MS
QTC INTERVAL: 475 MS
RBC # BLD AUTO: 3.51 MILLION/UL (ref 3.81–5.12)
SODIUM SERPL-SCNC: 136 MMOL/L (ref 136–145)
SODIUM SERPL-SCNC: 137 MMOL/L (ref 136–145)
T WAVE AXIS: 60 DEGREES
VANCOMYCIN TROUGH SERPL-MCNC: 21.1 UG/ML (ref 10–20)
VENTRICULAR RATE: 103 BPM
WBC # BLD AUTO: 14.74 THOUSAND/UL (ref 4.31–10.16)

## 2017-10-11 PROCEDURE — 83735 ASSAY OF MAGNESIUM: CPT | Performed by: EMERGENCY MEDICINE

## 2017-10-11 PROCEDURE — 87040 BLOOD CULTURE FOR BACTERIA: CPT | Performed by: NURSE PRACTITIONER

## 2017-10-11 PROCEDURE — 82550 ASSAY OF CK (CPK): CPT | Performed by: EMERGENCY MEDICINE

## 2017-10-11 PROCEDURE — 80202 ASSAY OF VANCOMYCIN: CPT | Performed by: INTERNAL MEDICINE

## 2017-10-11 PROCEDURE — 85027 COMPLETE CBC AUTOMATED: CPT | Performed by: EMERGENCY MEDICINE

## 2017-10-11 PROCEDURE — 80048 BASIC METABOLIC PNL TOTAL CA: CPT | Performed by: EMERGENCY MEDICINE

## 2017-10-11 PROCEDURE — 82948 REAGENT STRIP/BLOOD GLUCOSE: CPT

## 2017-10-11 PROCEDURE — 82553 CREATINE MB FRACTION: CPT | Performed by: EMERGENCY MEDICINE

## 2017-10-11 PROCEDURE — 93005 ELECTROCARDIOGRAM TRACING: CPT | Performed by: EMERGENCY MEDICINE

## 2017-10-11 PROCEDURE — 82330 ASSAY OF CALCIUM: CPT | Performed by: EMERGENCY MEDICINE

## 2017-10-11 PROCEDURE — 84100 ASSAY OF PHOSPHORUS: CPT | Performed by: EMERGENCY MEDICINE

## 2017-10-11 RX ORDER — POTASSIUM CHLORIDE 29.8 MG/ML
40 INJECTION INTRAVENOUS ONCE
Status: COMPLETED | OUTPATIENT
Start: 2017-10-11 | End: 2017-10-11

## 2017-10-11 RX ORDER — QUETIAPINE FUMARATE 25 MG/1
25 TABLET, FILM COATED ORAL
Status: DISCONTINUED | OUTPATIENT
Start: 2017-10-11 | End: 2017-10-13 | Stop reason: HOSPADM

## 2017-10-11 RX ORDER — DULOXETIN HYDROCHLORIDE 60 MG/1
60 CAPSULE, DELAYED RELEASE ORAL DAILY
Status: DISCONTINUED | OUTPATIENT
Start: 2017-10-11 | End: 2017-10-13 | Stop reason: HOSPADM

## 2017-10-11 RX ORDER — POTASSIUM CHLORIDE 14.9 MG/ML
20 INJECTION INTRAVENOUS ONCE
Status: COMPLETED | OUTPATIENT
Start: 2017-10-11 | End: 2017-10-11

## 2017-10-11 RX ORDER — HALOPERIDOL 5 MG/ML
2 INJECTION INTRAMUSCULAR ONCE
Status: COMPLETED | OUTPATIENT
Start: 2017-10-11 | End: 2017-10-11

## 2017-10-11 RX ORDER — POTASSIUM CHLORIDE 14.9 MG/ML
20 INJECTION INTRAVENOUS ONCE
Status: DISCONTINUED | OUTPATIENT
Start: 2017-10-11 | End: 2017-10-11

## 2017-10-11 RX ORDER — FAMOTIDINE 20 MG/1
20 TABLET, FILM COATED ORAL DAILY
Status: DISCONTINUED | OUTPATIENT
Start: 2017-10-11 | End: 2017-10-13 | Stop reason: HOSPADM

## 2017-10-11 RX ORDER — DIAZEPAM 5 MG/ML
10 INJECTION, SOLUTION INTRAMUSCULAR; INTRAVENOUS ONCE
Status: DISCONTINUED | OUTPATIENT
Start: 2017-10-11 | End: 2017-10-13 | Stop reason: HOSPADM

## 2017-10-11 RX ORDER — HALOPERIDOL 5 MG/ML
5 INJECTION INTRAMUSCULAR ONCE
Status: COMPLETED | OUTPATIENT
Start: 2017-10-11 | End: 2017-10-11

## 2017-10-11 RX ORDER — POTASSIUM CHLORIDE 14.9 MG/ML
60 INJECTION INTRAVENOUS ONCE
Status: DISCONTINUED | OUTPATIENT
Start: 2017-10-11 | End: 2017-10-11 | Stop reason: DRUGHIGH

## 2017-10-11 RX ADMIN — HEPARIN SODIUM 7500 UNITS: 5000 INJECTION, SOLUTION INTRAVENOUS; SUBCUTANEOUS at 05:25

## 2017-10-11 RX ADMIN — SODIUM CHLORIDE, SODIUM GLUCONATE, SODIUM ACETATE, POTASSIUM CHLORIDE, MAGNESIUM CHLORIDE, SODIUM PHOSPHATE, DIBASIC, AND POTASSIUM PHOSPHATE 100 ML/HR: .53; .5; .37; .037; .03; .012; .00082 INJECTION, SOLUTION INTRAVENOUS at 00:53

## 2017-10-11 RX ADMIN — CYCLOBENZAPRINE HYDROCHLORIDE 10 MG: 10 TABLET, FILM COATED ORAL at 12:55

## 2017-10-11 RX ADMIN — VANCOMYCIN HYDROCHLORIDE 1500 MG: 1 INJECTION, POWDER, LYOPHILIZED, FOR SOLUTION INTRAVENOUS at 01:14

## 2017-10-11 RX ADMIN — CYCLOBENZAPRINE HYDROCHLORIDE 10 MG: 10 TABLET, FILM COATED ORAL at 01:14

## 2017-10-11 RX ADMIN — POTASSIUM CHLORIDE 20 MEQ: 200 INJECTION, SOLUTION INTRAVENOUS at 04:37

## 2017-10-11 RX ADMIN — SODIUM CHLORIDE, SODIUM GLUCONATE, SODIUM ACETATE, POTASSIUM CHLORIDE, MAGNESIUM CHLORIDE, SODIUM PHOSPHATE, DIBASIC, AND POTASSIUM PHOSPHATE 100 ML/HR: .53; .5; .37; .037; .03; .012; .00082 INJECTION, SOLUTION INTRAVENOUS at 21:26

## 2017-10-11 RX ADMIN — QUETIAPINE FUMARATE 25 MG: 25 TABLET ORAL at 21:27

## 2017-10-11 RX ADMIN — CYCLOBENZAPRINE HYDROCHLORIDE 10 MG: 10 TABLET, FILM COATED ORAL at 06:51

## 2017-10-11 RX ADMIN — SODIUM CHLORIDE, SODIUM GLUCONATE, SODIUM ACETATE, POTASSIUM CHLORIDE, MAGNESIUM CHLORIDE, SODIUM PHOSPHATE, DIBASIC, AND POTASSIUM PHOSPHATE 1000 ML: .53; .5; .37; .037; .03; .012; .00082 INJECTION, SOLUTION INTRAVENOUS at 00:00

## 2017-10-11 RX ADMIN — CALCIUM GLUCONATE 1 G: 94 INJECTION, SOLUTION INTRAVENOUS at 09:43

## 2017-10-11 RX ADMIN — HALOPERIDOL LACTATE 5 MG: 5 INJECTION, SOLUTION INTRAMUSCULAR at 17:19

## 2017-10-11 RX ADMIN — PANTOPRAZOLE SODIUM 40 MG: 40 TABLET, DELAYED RELEASE ORAL at 05:25

## 2017-10-11 RX ADMIN — SENNOSIDES 8.6 MG: 8.6 TABLET, FILM COATED ORAL at 21:27

## 2017-10-11 RX ADMIN — HALOPERIDOL LACTATE 2 MG: 5 INJECTION, SOLUTION INTRAMUSCULAR at 09:22

## 2017-10-11 RX ADMIN — SODIUM CHLORIDE 4 UNITS/HR: 9 INJECTION, SOLUTION INTRAVENOUS at 10:24

## 2017-10-11 RX ADMIN — CEFEPIME HYDROCHLORIDE 1000 MG: 1 INJECTION, POWDER, FOR SOLUTION INTRAMUSCULAR; INTRAVENOUS at 10:24

## 2017-10-11 RX ADMIN — SODIUM CHLORIDE, SODIUM GLUCONATE, SODIUM ACETATE, POTASSIUM CHLORIDE, MAGNESIUM CHLORIDE, SODIUM PHOSPHATE, DIBASIC, AND POTASSIUM PHOSPHATE 100 ML/HR: .53; .5; .37; .037; .03; .012; .00082 INJECTION, SOLUTION INTRAVENOUS at 11:07

## 2017-10-11 RX ADMIN — CEFTRIAXONE SODIUM 2000 MG: 10 INJECTION, POWDER, FOR SOLUTION INTRAVENOUS at 21:30

## 2017-10-11 RX ADMIN — POTASSIUM CHLORIDE 40 MEQ: 400 INJECTION, SOLUTION INTRAVENOUS at 03:11

## 2017-10-11 RX ADMIN — DULOXETINE 60 MG: 60 CAPSULE, DELAYED RELEASE ORAL at 11:39

## 2017-10-11 NOTE — PROGRESS NOTES
Resident updated on patient  Informed that patient has no a-line at this time  Patient remains febrile on cooling blanket  Temp fluctuating 100 8-101  Remains tachycardic in the 120's   Ordered to give fluid bolus and dose of IV Vanco

## 2017-10-11 NOTE — PROGRESS NOTES
Pt extremely combative and refusing care at this time  Monitor leads and pulse ox pulled off and becomes violent and aggressive when approached to replace  CCM team notified  PRN haldol to be given   Will cont to monitor

## 2017-10-11 NOTE — PLAN OF CARE
Tonight I will monitor patient's vital signs and I/O's  I will ensure patient safety at all times and ensure frequent repositioning in bed as patient tolerates          DISCHARGE PLANNING - CARE MANAGEMENT     Discharge to post-acute care or home with appropriate resources Progressing        Nutrition/Hydration-ADULT     Nutrient/Hydration intake appropriate for improving, restoring or maintaining nutritional needs Progressing        Potential for Falls     Patient will remain free of falls Progressing        Prexisting or High Potential for Compromised Skin Integrity     Skin integrity is maintained or improved Progressing

## 2017-10-11 NOTE — PROGRESS NOTES
Progress Note - ICU Transfer to Step down/med  surg  - Yanique Mills 47 y o  female MRN: [id number]    [facility]: [id number]      Code Status: Level 1 - Full Code    Date of ICU admission: 10/9/17    Reason for ICU adm: Septic shock 2/2 UTI, encephalopathy, rhabdo, JORDAN    PCP:  Dr Pia Perla Accepting:  Dr Tata Leggett    Active problems:   Patient Active Problem List   Diagnosis    Altered mental status    Sepsis ([facility]    Acute cystitis    Acute renal failure (ARF) ([facility]    Cerebral meningioma ([facility]    MARISA on CPAP    Chronic pain syndrome    DM2 (diabetes mellitus, type 2) ([facility]    [facility]    KATIE (generalized anxiety disorder)    MDD (major depressive disorder)    Colon cancer ([facility]    Liver cancer ([facility]    HTN (hypertension)    HLD (hyperlipidemia)    Anemia    Chronic systolic congestive heart failure (HCC)    Status post colectomy    Pyuria    Elevated brain natriuretic peptide (BNP) level       Summary of clinical course:  61-year-old female with a past medical history of CHF most recent echo with an ejection fraction of 50-55%, colon cancer, ischemic bowel status post resection in July at [facility] , MARISA on CPAP, diabetes who initially presented to our emergency department secondary to altered mental status  She was found down in her home surrounded by urine and feces  Upon presentation to our emergency department, she was found to have a significant a KI with a creatinine of 6 8 and a CK of 1000 for prolonged down time  She was also found to have a UTI  Urine culture growing enteric like gram negative rods  Blood culture is growing gram negative rods and gram-positive rods  Gram-positive rods thought to be a contaminant  However, patient intermittently dosed with vancomycin  She was also found to have elevated blood glucose and DKA  She was started on insulin drip and given fluids    She was ultimately admitted to the medicine as level in step-down  Overnight on October 9th, the patient became acutely encephalopathic and hypotensive  She was admitted to the ICU for blood pressure management for her septic shock  She was ultimately started on Levophed and vasopressin with a goal map of 65-70  Her mentation gradually improved  Her kidney function has progressively improved as well  DKA resolved with fluids and insulin drip  Nephrology was consulted by the medicine team and believes that her acute kidney injury is likely due to septic shock  Her lactic acidosis has cleared  CK remains slightly elevated despite fluids  We are holding her statin therapy for hyperlipidemia  Continue IV fluid hydration and boluses as needed  She was also evaluated by Infectious Disease who believe cefepime is appropriate  She is currently on 2 g b i d  Keren Castillo Last night, the patient became acutely delirious and febrile  She was given Tylenol and put on a cooling blanket  She has been afebrile for the past 12 hours  The patient is still delirious  I spoke with her mom who states that this is typical when she is hospitalized  She was given 2 mg of IV Haldol with great effect  I placed her on Seroquel at night  Vasopressors were discontinued last night (October 10th)  The patient remains on insulin drip because of her elevated glucose  She is stable for transfer to the floor  Trending CK and BMPs daily  Update:  Blood and urine cultures growing klebsiella  Patient switched to ceftriaxone 2g Q24hr  Checking a vanc trough today and adjust dose accordingly  Monitor repeat blood cultures (10/11)  Recent or scheduled procedures:   10/9 CT chest/abd/pelvis:  Although the study was limited by the lack of intravenous contrast, there is no gross evidence of solid organ injury  10 mm right lower lobe groundglass nodular opacity   A short-term follow-up CT chest in 3 months is recommended for further evaluation   No acute intra-abdominal abnormality   No free air or free fluid  15 mm left adrenal adenoma  10/9 CT c-spine:  No cervical spine fracture or traumatic malalignment   The study was mildly limited by patient positioning as the patient's head was turned to the right  Mild multilevel spondylitic degenerative changes of the cervical spine  10/9 CT head:  No acute intracranial abnormality  Left frontal calcified meningioma not significant changed in size when compared to an MRI brain dated March 22, 2005   10/9 CT head:  No acute intracranial abnormality  21/50 Echo:  Systolic function was at the lower limits of normal  Ejection fraction was estimated in the range of 50 % to 55 %  Creatinine 5 36 -> 5 33 -> 4 18 -> 3 67 -> 3 07 -> 2 92 -> 2 65  CK  1,088 -> 689 -> 946    [ ] Vanc trough (1230)  [ ] Blood cultures X 2 (10/11)        Outstanding/pending diagnostics:   Trend CK and BMPs daily  Start statin once CKs normalize  Insulin drip, Possibly endo consult for glucose management  Hospital discharge planning:  Home vs  Assisted living  Mother expressed concern about going home to her 2-level house because she lives alone

## 2017-10-11 NOTE — PROGRESS NOTES
Progress Note - Critical Care   Elson Boast 47 y o  female MRN: [id number]  Unit/Bed#: MICU 08 Encounter: [id number]    Attending Physician: Nikki Vargas, DO      ______________________________________________________________________  Assessment and Plan:   Principal Problem:    Sepsis ([place] [address] )  Active Problems:    Altered mental status    Acute cystitis    Acute renal failure (ARF) (HCC)    Cerebral meningioma (HCC)    MARISA on CPAP    Chronic pain syndrome    DM2 (diabetes mellitus, type 2) (HCC)    Continuous opioid dependence (HCC)    KATIE (generalized anxiety disorder)    MDD (major depressive disorder)    Colon cancer ([place] [address] )    Liver cancer ([place] [address] )    HTN (hypertension)    HLD (hyperlipidemia)    Anemia    Chronic systolic congestive heart failure (HCC)    Status post colectomy    Pyuria    Elevated brain natriuretic peptide (BNP) level  Resolved Problems:    CAD (coronary artery disease)        Neuro: AMS/Facial droop - 2/2 septic shock (hypotension), improved, CT head negative  Continue Q2H neurochecks  -  Chronic pain - avoid any sedating medications, tylenol PRN for pain  -  Delirium - spoke with mother this AM who states that this is typical during her hospitalizations  Will give IV haldol and start seroquel at night   -  Depression - start home cymbalta  CV: Hypotension - 2/2 septic shock  Resolved  Off vasopressors  Given 2L isolyte over night for hyperthermia and hypotension   -  HLD - continue home statin  -  H/o HTN - Hold home antihypertensives  -  H/o CHF - Echo on 10/10 revealed EF 50 - 55%  Monitor volume status clinically  Pulm: MARISA - CPAP qHS, encourage IS  Monitor respiratory status  GI: GERD - protonix 40mg daily, start home pepcid  -  H/o ischemic bowel w/ h/o colon adenocarcinoma - s/p ex lap w/ transverse colectomy 7/26/17 w/ Dr Yogesh Gonzalez       : UTI resulting in septic shock (gm - rods) - cefepime 2g q12h daily (day #2), monitor creatinine and up dose as improves, D/C pablo   -  JORDAN - Creatinine and BUN continue to improve  Baseline <1  Nephrology following  Check BMP this AM, if close to baseline, possible d/c fluids  Check daily bmp  F/E/N: replete lytes as necessary, bolus fluids as necessary  - 100cc/hr isolyte     ID: UTI resulting in septic shock - cefepime 2g q12h (day #2), lactic improved  ID consulted  Urine culture growing enteric like gm - rods  Blood cultures also grown gm + rods  Given dose of vancomycin over night  Hold additional doses  Heme: Anemia - stable  -  Elevated CK - slightly elevated this AM, 2/2 fall with prolonged down time  Continue IV hydration   -  DVT proph - SCDs, subq heparin  Endo: DMII with hyperglycemia - insulin gtt (217 u/ 24 hours) for better glucose control  Glucose 120 - 226   -  DKA - 2/2 septic shock, resolved, resolved anion gap and bicarb  Msk/Skin: Frequent turns and offloading     Disposition: Transfer to floor  Code Status: Level 1 - Full Code    Counseling / Coordination of Care  Total time spent today 35 minutes  Greater than 50% of total time was spent with the patient and / or family counseling and / or coordination of care  A description of the counseling / coordination of care: N/A  ______________________________________________________________________    Chief Complaint: No complaints    24 Hour Events: Became hyperthermic over night  Given 2L isolyte and cooling blanket  Given 1 5 g vanco   Episode resolved  Afebrile for almost past 12 hours  ______________________________________________________________________    Physical Exam   Constitutional: She appears well-developed and well-nourished  No distress  Obese female in no acute respiratory distress   HENT:   Head: Normocephalic and atraumatic  Right Ear: External ear normal    Left Ear: External ear normal    Eyes: Conjunctivae are normal  Pupils are equal, round, and reactive to light   Right eye exhibits no discharge  Left eye exhibits no discharge  Neck: Normal range of motion  Neck supple  No tracheal deviation present  No thyromegaly present  Cardiovascular: Exam reveals tachycardia, no gallop and no friction rub  Pulmonary/Chest: Effort normal and breath sounds normal  No respiratory distress  She has no wheezes  She has no rales  She exhibits no tenderness  Abdominal: Soft  Bowel sounds are normal  She exhibits no distension  There is no tenderness  There is no rebound and no guarding  obese   Genitourinary:   Genitourinary Comments: Lobato in place   Musculoskeletal: She exhibits no edema, tenderness or deformity  RIJ triple lumen, L radial a-line  Neurological: She is alert  AOX3, left sided facial droop resolved, MS 5/5 bilaterally UE/LE, EOMI, PERRLA   Skin: Skin is warm and dry  She is not diaphoretic        ______________________________________________________________________  Vitals:    10/11/17 0544 10/11/17 0642 10/11/17 0651 10/11/17 0944   BP: (!) 110/39  (!) 111/40 (!) 135/40   Pulse: 96  94 96   Resp: 18  20 (!) 31   Temp:  98 6 °F (37 °C)     TempSrc:  Rectal     SpO2: 97%  97%    Weight:       Height:           Temperature:   Temp (24hrs), Av 8 °F (38 8 °C), Min:98 6 °F (37 °C), Max:105 6 °F (40 9 °C)    Current Temperature: 98 6 °F (37 °C)  Weights:   IBW: 54 7 kg    Body mass index is 55 1 kg/m²    Weight (last 2 days)     Date/Time   Weight    10/11/17 0541  (!)  146 (320 99)    10/09/17 1931  (!)  144 (316 8)            Hemodynamic Monitoring:  N/A     Non-Invasive/Invasive Ventilation Settings:  Respiratory    Lab Data (Last 4 hours)    None         O2/Vent Data (Last 4 hours)    None              Lab Results   Component Value Date    PHART 7 353 10/10/2017    NUM6CUF 34 2 (L) 10/10/2017    PO2ART 124 3 10/10/2017    ULU4LCE 18 6 (L) 10/10/2017    BEART -6 2 10/10/2017    SOURCE Brachial, Right 10/10/2017     SpO2: SpO2: 97 %  Intake and Outputs:  I/O       10/09 0701 - 10/10 0700 10/10 0701 - 10/11 0700    P  O  0 1440    I V  (mL/kg) 1405 1 (9 8) 5568 7 (38 1)    IV Piggyback 1200 656 7    Total Intake(mL/kg) 2605 1 (18 1) 7665 3 (52 5)    Urine (mL/kg/hr) 1450 2965 (0 8)    Total Output 1450 2965    Net +1155 1 +4700 3                 Nutrition:        Diet Orders            Start     Ordered    10/10/17 1231  Diet Regular; Regular House; Consistent Carbohydrate Diet Level 2 (5 carb servings/75 grams CHO/meal)  Diet effective now     Question Answer Comment   Diet Type Regular    Regular Regular House    Other Restriction(s): Consistent Carbohydrate Diet Level 2 (5 carb servings/75 grams CHO/meal)    RD to adjust diet per protocol? Yes        10/10/17 1231          Labs:     Results from last 7 days  Lab Units 10/11/17  0436 10/10/17  2210 10/10/17  0431  10/09/17  1113   WBC Thousand/uL 14 74* 7 26 17 70*  --  17 24*   HEMOGLOBIN g/dL 9 0* 8 9* 9 5*  --  10 8*   I STAT HEMOGLOBIN   --   --   --   < >  --    HEMATOCRIT % 27 2* 27 2* 29 1*  --  33 2*   PLATELETS Thousands/uL 173 159 223  < > 273   MONO PCT MAN %  --  1*  --   --  4   < > = values in this interval not displayed      Results from last 7 days  Lab Units 10/11/17  0436 10/11/17  0057 10/10/17  2210  10/10/17  0431  10/09/17  1113   SODIUM mmol/L 136 137 136  < > 132*  < > 127*   POTASSIUM mmol/L 4 1 3 4* 3 9  < > 3 8  < > 4 9   CHLORIDE mmol/L 103 102 103  < > 99*  < > 93*   CO2 mmol/L 24 23 21  < > 23  < > 19*   BUN mg/dL 50* 50* 54*  < > 69*  < > 72*   CREATININE mg/dL 2 65* 2 92* 3 07*  < > 5 36*  < > 6 89*   CALCIUM mg/dL 7 8* 7 4* 7 7*  < > 8 0*  < > 8 8   TOTAL PROTEIN g/dL  --   --  6 3*  --  6 8  --  7 5   BILIRUBIN TOTAL mg/dL  --   --  0 57  --  0 44  --  0 63   ALK PHOS U/L  --   --  125*  --  91  --  108   ALT U/L  --   --  23  --  19  --  21   AST U/L  --   --  59*  --  38  --  40   GLUCOSE RANDOM mg/dL 140 216* 185*  < > 285*  < > 491*   GLUCOSE, ISTAT   --   --   --   --   --   < >  --    < > = values in this interval not displayed  Results from last 7 days  Lab Units 10/11/17  0436 10/10/17  0431   MAGNESIUM mg/dL 2 1 1 8     Lab Results   Component Value Date    PHOS 2 7 10/11/2017    PHOS 4 5 10/10/2017        Results from last 7 days  Lab Units 10/09/17  1113   INR  1 29*   PTT seconds 34     No results found for: TROPONINI    Results from last 7 days  Lab Units 10/10/17  2210 10/10/17  0433 10/10/17  0207   LACTIC ACID mmol/L 1 4 1 3 0 8     ABG:  Lab Results   Component Value Date    PHART 7 353 10/10/2017    CXV3ZMM 34 2 (L) 10/10/2017    PO2ART 124 3 10/10/2017    WDK2PKF 18 6 (L) 10/10/2017    BEART -6 2 10/10/2017    SOURCE Brachial, Right 10/10/2017     Imaging:  I have personally reviewed pertinent reports  Micro:  Lab Results   Component Value Date    URINECX >100,000 cfu/ml Gram Negative Andrade Enteric Like 10/09/2017     Allergies: No Known Allergies  Medications:   Scheduled Meds:    aspirin 81 mg Oral Daily   atorvastatin 20 mg Oral Daily With Dinner   cefepime 2,000 mg Intravenous Q12H   DULoxetine 60 mg Oral Daily   famotidine 20 mg Oral Daily   heparin (porcine) 7,500 Units Subcutaneous Q8H [address]   QUEtiapine 25 mg Oral HS   senna 1 tablet Oral HS     Continuous Infusions:    insulin regular (HumuLIN R,NovoLIN R) infusion 0 3-21 Units/hr Last Rate: 4 Units/hr (10/11/17 1024)   multi-electrolyte 100 mL/hr Last Rate: 100 mL/hr (10/11/17 0333)     PRN Meds:    acetaminophen 325 mg Q4H PRN   acetaminophen 650 mg Q6H PRN   aluminum-magnesium hydroxide-simethicone 30 mL Q6H PRN   cyclobenzaprine 10 mg TID PRN   ondansetron 4 mg Q6H PRN   polyethylene glycol 17 g Daily PRN   sodium chloride (PF) 3 mL PRN     VTE Pharmacologic Prophylaxis: Sequential compression device (Venodyne)  and Heparin  VTE Mechanical Prophylaxis: sequential compression device  Invasive lines and devices:   Invasive Devices     Central Venous Catheter Line            CVC Central Lines 10/09/17 Triple 16cm 1 day          Peripheral Intravenous Line            Peripheral IV 10/09/17 Right Antecubital 1 day                     Portions of the record may have been created with voice recognition software  Occasional wrong word or "sound a like" substitutions may have occurred due to the inherent limitations of voice recognition software  Read the chart carefully and recognize, using context, where substitutions have occurred      Johnny Wall MD

## 2017-10-11 NOTE — PHYSICAL THERAPY NOTE
Pt is in the process of transferring to another unit (transport has arrived); will follow      Eloina Marie PT

## 2017-10-11 NOTE — PROGRESS NOTES
Pt becoming less aggressive, but still paranoid and refusing certain nursing care  Cont  To refuse pulse ox  BP cuff replaced and some tele leads applied  Will cont to monitor

## 2017-10-11 NOTE — PROGRESS NOTES
Progress Note - Nephrology   Lorri Jennings 47 y o  female MRN: [id number]  Unit/Bed#: MICU 08 Encounter: [id number]    ASSESSMENT AND PLAN:  1  JORDAN:  Most compatible with prerenal azotemia/ACE-inhibitor/NSAID use with ATN from sepsis and hypoperfusion possibly mild component of rhabdomyolysis  Renal function continues to improve at 2 92 mg/dL with greater than 3 L urine output  Patient is positive overall about 6 L  Recommendations:  -urine sodium 61  -urine osmolality 337  -UA:  Innumerable WBCs/innumerable bacteria/2-4 RBCs with moderate blood  -CT without any acute intra-abdominal process but negative obstructive uropathy  Treatment:  -IV fluid treatment per primary service  I would recommend tapering possibly even stopping if she is eating and she has positive fluid balance   -treat sepsis  -monitor intake and output and labs closely  2  Metabolic acidosis:  Secondary to acute kidney injury, no evidence of further lactic acidosis, probable diabetic ketoacidosis given 2+ acetone  Treatment per primary service  Bicarbonate improved to 22  3  Hyponatremia:  Resolved with volume replacement 137 as of early this morning  4   Hypotension:  Most likely related to septicemia  TSH acceptable  Cortisol 100  Echocardiogram without any significant pathology JORDAN  Blood pressure is now normalized and slightly elevated possibly related to agitation  Monitor for now  May have to reinstitute a form of hypertensive agent  5   Sepsis:?  Urosepsis pressure with gram-negative bacteremia  Treatment per primary service and Infectious Disease per  Discussed with Critical Care Nursing at the bedside           Subjective:   Patient is very confused and agitated this morning  She does not trust anybody  She states she feels great without any nausea vomiting diarrhea chest pain or shortness of breath  She is urinating on her own with Lobato catheter removed earlier today      Objective:     Vitals: Blood pressure (!) 111/40, pulse 94, temperature 98 6 °F (37 °C), temperature source Rectal, resp  rate 20, height 5' 4" (1 626 m), weight (!) 146 kg (320 lb 15 8 oz), SpO2 97 %  ,Body mass index is 55 1 kg/m²  Weight (last 2 days)     Date/Time   Weight    10/11/17 0541  (!)  146 (320 99)    10/09/17 1931  (!)  144 (316 8)                Intake/Output Summary (Last 24 hours) at 10/11/17 0933  Last data filed at 10/11/17 0905   Gross per 24 hour   Intake          6653 51 ml   Output             2860 ml   Net          3793 51 ml            Physical Exam: General:  Slightly agitated and confused but no acute distress  Skin:  No acute rash  Eyes:  No scleral icterus  ENT:  Moist mucous membranes  Neck:  Supple, no jugular venous distention  Chest:  Clear to auscultation but poor effort  CVS:  Regular rate and rhythm without a cardiac rub or gallops  Abdomen:   Morbidly obese, soft and nontender with normal bowel sounds  Extremities: 1+ lower extremity edema  Neuro:  Grossly intact  Psych:  Alert, confused and agitated                Medications:    Scheduled Meds:  aspirin 81 mg Oral Daily   atorvastatin 20 mg Oral Daily With Dinner   calcium gluconate 1 g Intravenous Once   cefepime 1,000 mg Intravenous Q12H   heparin (porcine) 7,500 Units Subcutaneous Q8H [address]   pantoprazole 40 mg Oral Early Morning   QUEtiapine 25 mg Oral HS   senna 1 tablet Oral HS       PRN Meds:   acetaminophen    acetaminophen    aluminum-magnesium hydroxide-simethicone    cyclobenzaprine    ondansetron    polyethylene glycol    sodium chloride (PF)    Continuous Infusions:  insulin regular (HumuLIN R,NovoLIN R) infusion 0 3-21 Units/hr Last Rate: 8 Units/hr (10/11/17 0740)   multi-electrolyte 100 mL/hr Last Rate: 100 mL/hr (10/11/17 0333)   norepinephrine 1-30 mcg/min Last Rate: Stopped (10/10/17 1640)   vasopressin (PITRESSIN) in 0 9 % sodium chloride 100 mL 0 03 Units/min Last Rate: Stopped (10/10/17 2053)       Lab, Imaging and other studies: I have personally reviewed pertinent labs  Laboratory Results:    Results from last 7 days  Lab Units 10/11/17  0436 10/11/17  0057 10/10/17  2210 10/10/17  1641 10/10/17  1222 10/10/17  0433 10/10/17  0431 10/10/17  0207  10/09/17  2027 10/09/17  1123 10/09/17  1113   WBC Thousand/uL 14 74*  --  7 26  --   --   --  17 70*  --   --   --   --  17 24*   HEMOGLOBIN g/dL 9 0*  --  8 9*  --   --   --  9 5*  --   --   --   --  10 8*   I STAT HEMOGLOBIN g/dl  --   --   --   --   --   --   --   --   --   --  12 2  --    HEMATOCRIT % 27 2*  --  27 2*  --   --   --  29 1*  --   --   --   --  33 2*   PLATELETS Thousands/uL 173  --  159  --   --   --  223  --   --  239  --  273   SODIUM mmol/L  --  137 136 136 135* 134* 132* 137  < >  --   --  127*   POTASSIUM mmol/L  --  3 4* 3 9 4 1 3 6 3 8 3 8 3 5  < >  --   --  4 9   CHLORIDE mmol/L  --  102 103 103 101 99* 99* 108  < >  --   --  93*   CO2 mmol/L  --  23 21 22 22 24 23 17*  < >  --   --  19*   BUN mg/dL  --  50* 54* 56* 64* 69* 69* 57*  < >  --   --  72*   CREATININE mg/dL  --  2 92* 3 07* 3 67* 4 18* 5 33* 5 36* 4 28*  < >  --   --  6 89*   CALCIUM mg/dL  --  7 4* 7 7* 8 0* 8 0* 8 3 8 0* 6 4*  < >  --   --  8 8   MAGNESIUM mg/dL 2 1  --   --   --   --   --  1 8  --   --   --   --   --    PHOSPHORUS mg/dL 2 7  --   --   --   --   --  4 5  --   --   --   --   --    ALBUMIN g/dL  --   --  2 3*  --   --   --  2 7*  --   --   --   --  2 9*   TOTAL PROTEIN g/dL  --   --  6 3*  --   --   --  6 8  --   --   --   --  7 5   GLUCOSE RANDOM mg/dL  --  216* 185* 184* 189* 293* 285* 266*  < >  --   --  491*   GLUCOSE, ISTAT mg/dl  --   --   --   --   --   --   --   --   --   --  492*  --    < > = values in this interval not displayed    Urinalysis: Lab Results   Component Value Date    COLORU Yellow 10/09/2017    CLARITYU Cloudy 10/09/2017    SPECGRAV 1 020 10/09/2017    PHUR 5 5 10/09/2017    LEUKOCYTESUR Moderate (A) 10/09/2017    NITRITE Negative 10/09/2017    PROTEINUA 100 (2+) (A) 10/09/2017    GLUCOSEU 250 (1/4%) (A) 10/09/2017    KETONESU Negative 10/09/2017    BILIRUBINUR Negative 10/09/2017    BLOODU Moderate (A) 10/09/2017     ABGs: No results found for: Nenitaacheyenifer 30  Radiology review:     Portions of the record may have been created with voice recognition software   Occasional wrong word or "sound a like" substitutions may have occurred due to the inherent limitations of voice recognition software   Read the chart carefully and recognize, using context, where substitutions have occurred

## 2017-10-11 NOTE — PROGRESS NOTES
Patient rolled onto the floor around [id number]  At first she did not want to get off of the transport stretcher, then quickly escalated saying she was clausterphobic and tried to climb off of the stretcher  Transporter and I tried to hold the patient down, and in return she threw a basin full of personal care supplies and tried hitting and kicking us  At that time I called a control team  Team arrived on the floor and restrained the patient, as 5mg IM Haldol was ordered and administered  The patient was yelling at staff as the control team got her into her room and onto her bed  Soft wrist restraints were ordered, and applied at 1730  The patient is still agitated at this time, but more cooperative  Wrist restraints remain in place  Will continue to monitor

## 2017-10-11 NOTE — MEDICAL STUDENT
Ms Mia Webster is a 47 yoF with a PMHx of colon cancer (1990s), CHF, chronic pain who presented with AMS and was found to have septic shock, acute renal failure, and AG metabolic acidosis  Yesterday pressors were weaned  Overnight she was again febrile to 105 and tachycardic, with BP ranging from hypotensive to hypertensive  She was given 2L IVF bolus, 1 dose of vancomycin, and cooling blanket  At some point her A line was removed  She also received 60mEq of K Cl  This morning she is demanding to be discharged and states that we have been watching her through the TV  She is berating the nurse for not giving her medications (which were given) and is refusing to explain how she would get home or take care of herself if she were discharged  Per nursing, she was confused but not paranoid until early this morning  She is also complaining of pain in her back/shoulder, and headache  She states that she had some diarrhea this morning  She remembers more of the events leading up to hospitalization       PMHx: colon cancer s/p R hemicolectomy and chemo (1999 per records), cerebral meningioma, CHF, MARISA on CPAP, HTN, HLD, T2DM, chronic pain on opioids, KATIE, MDD    VS: Tm 105 6, afebrile since midnight; HR 90s-140s, RR 14-43, BP 50s/20s - 190s/150s, O2  RA    I/O: 7 6L/2 9L (0 8mL/kg/hr) -> +4 7L  Tele: tachycardic to 160s    PE:  Gen: not in distress  Neuro: GCS 15, confused, paranoid, irritable, hostile, insight and judgement impaired  Unable to assess rest of exam as she refused    Labs:  137/ 102/ 50 (69)    AST/ALT 59/23 (38/19),  (91),   3 4/ 23/ 2 92 (5 33) Tbili 0 57    AG 12, Ca 7 4 (alb 2 7), phos 2 7, Mg 2 1  Adm: Na 132, bicarb 18, AG 14, BUN 73, creat 6 57    WBC 14 7 (7 2<-17 7) / Hgb 9 (9 5) / Plts 173 / MCV 78   (689, adm 1088), lactic acid 1 4 (3 1 on adm)  Pro-BNP 7285 adm    A1c 10, TSH 0 170  Coags PT 16 2, PTT 34, INR 1 29  Acetone 2+  UA: +glu, blood, leukocytes, prot, WBC, bact  UCx >100k GNR enteric like  BCx GNR, GPR    BG 110s-220s on 0-12U/hr (alg 5); 200U/24 hr    Imaging:  10/10 CXR: no acute cardiopulmonary findings  10/10 echo: EF 50-55%, mild TR  10/10 EKG: sinus tach, nonspecific T wave abnormality  10/9 EKG sinus tach  10/9 CT C/A/P: no acute intra-abdom abnorm, 15mm L adrenal adenoma, 10mm RLL nodular opacity  10/9 CT C spine: no fx, mild degen chg, scattered tiny L thyroid cystic nodules  10/9 1pm CTH: no mass, acute infarct/hemorr, stable L frontal meningioma (dating to 2005)   10/9 8pm CTH: no change    Meds: ASA 81, atorvastatin 20, protonix 40 PO, heparin 7500U  Today: K Cl 40mEq, Mg sulfate 1g  Abx: got 1 dose of vanco, on cefepime 1g Q12 (day 3)    Drips: insulin @ 9, isolyte @ 100/hr, levo @ 15, vasopressin @ 0 03    Lines: central line (R IJ), periph R antecub, L radial A line    A: septic shock likely 2/2 UTI, AG metabolic acidosis - appears resolved, T2DM with hyperglycemia, CHF, JORDAN, toxic metabolic encephalopathy, facial droop - resolved, lactic acidosis vs DKA, sacral pressure ulcer     P:  Neuro:  -AMS/facial droop: negative CTH, droop resolved -delirium precautions - may require medication  -chronic pain: Tylenol, flexeril prn    CV:   -septic shock: white count improving but temperature and BP remain labile  MAP goal >65, hold anti-hypertensives, hold lasix  Cefepime day 3 - will consider adding vancomycin back if she does not improve  -CHF: echo with EF 50-55%, previous with EF 35-40  Continue monitoring    Pulm:  -CXR with no acute abnormalities  -MARISA with CPAP use: monitor sats at night    GI: on carb control diet, continue protonix (GERD)    FEN:  -on isolyte @ 730/BM  -AG metabolic acidosis, possible DKA: lactic acidosis resolved, AG normalized, bicarb normalized  Continue insulin drip, IVF    :  -JORDAN: Baseline creat 0 8-1  Kidney function improving  Hold lisinopril/metformin; continue IVF   Continue adjusting renal dose Abx as kidney function improves  -UTI: F/U UCx, BCx  -elevated CK: trending up today, continue IVF    ID:  -UTI/septic shock: continue cefepime 1g Q12 (day 3)  -ID c/s: continue cefepime    Heme: anemia at 9 0, monitor, transfuse if <7  -per records, Hgb 11 in August but low iron/iron sat, high transferrin/TIBC -> likely Fe-deficiency anemia  -ppx: heparin, SCDs    Endo:   -uncontrolled T2DM with hyperglycemia: continue insulin drip, IVF  -low TSH: recheck as outpatient  -random cortisol 100    MSK/skin:  -continue ulcer ppx/Tx

## 2017-10-11 NOTE — PROGRESS NOTES
Progress Note - Infectious Disease   Rise Valerie 47 y o  female MRN: [id number]  Unit/Bed#: MICU 08 Encounter: [id number]      Impression/Recommendations:     47 y o  female with recent hospitalization in 2017 for ischemic bowel s/p ex lap with transverse colectomy admitted 10/9 after she was found down at home by EMS laying in her own feces/urine  Presented with hyperthermia up to 106 5, severe JORDAN, rhabdomyolysis       1  Septic shock with Klebsiella bacteremia- There were also GPRs reported on gram stain of the blood cultures  I spoke with micro lab and there are no GPRs growing  I still suspect a urinary source as the pt is clinically improving with better hemodynamics, now off pressors  CT of chest/abdomen/pelvis unremarkable for infection  -will follow-up blood cultures to see if anything else grows   -repeat blood cultures drawn today- will follow   -follow-up urine cx  -we can narrow to ceftriaxone 2g IV q24hrs  We can also continue with vancomycin until we get final blood cultures  Will check a vanco trough today and re-dose accordingly    -monitor temps, wbc count      2  Encephalopathy- likely toxic-metabolic secondary to #2  -mentation is improving with less confusion  -plan as above     3  JORDAN  -improving Cr; will continue to renally adjust abx dosages     4  Uncontrolled diabetes  -recommend good blood glucose control in the setting of infection     Antibiotics:  Vancomycin/cefepime D3    Subjective:  Patient seen on AM rounds  Episodes of hyperthermia and hypotension overnight  Given a dose of vancomycin  Pt now off of pressors  On room air  She does not want to answer questions      Objective:  Vitals:  HR:  [] 94  Resp:  [14-43] 20  BP: ()/() 111/40  SpO2:  [80 %-100 %] 97 %  Temp (24hrs), Av 8 °F (38 8 °C), Min:98 6 °F (37 °C), Max:105 6 °F (40 9 °C)  Current: Temperature: 98 6 °F (37 °C)    Physical Exam:   Pt refusing a physical exam today    Lab Results:  I have personally reviewed pertinent labs  Results from last 7 days  Lab Units 10/11/17  0057 10/10/17  2210 10/10/17  1641  10/10/17  0431  10/09/17  1113   SODIUM mmol/L 137 136 136  < > 132*  < > 127*   POTASSIUM mmol/L 3 4* 3 9 4 1  < > 3 8  < > 4 9   CHLORIDE mmol/L 102 103 103  < > 99*  < > 93*   CO2 mmol/L 23 21 22  < > 23  < > 19*   ANION GAP mmol/L 12 12 11  < > 10  < > 15*   BUN mg/dL 50* 54* 56*  < > 69*  < > 72*   CREATININE mg/dL 2 92* 3 07* 3 67*  < > 5 36*  < > 6 89*   EGFR ml/min/1 73sq m 18 17 14  < > 9  < > 6   GLUCOSE RANDOM mg/dL 216* 185* 184*  < > 285*  < > 491*   GLUCOSE, ISTAT   --   --   --   --   --   < >  --    CALCIUM mg/dL 7 4* 7 7* 8 0*  < > 8 0*  < > 8 8   AST U/L  --  59*  --   --  38  --  40   ALT U/L  --  23  --   --  19  --  21   ALK PHOS U/L  --  125*  --   --  91  --  108   TOTAL PROTEIN g/dL  --  6 3*  --   --  6 8  --  7 5   ALBUMIN g/dL  --  2 3*  --   --  2 7*  --  2 9*   BILIRUBIN TOTAL mg/dL  --  0 57  --   --  0 44  --  0 63   < > = values in this interval not displayed  Results from last 7 days  Lab Units 10/11/17  0436 10/10/17  2210 10/10/17  0431   WBC Thousand/uL 14 74* 7 26 17 70*   HEMOGLOBIN g/dL 9 0* 8 9* 9 5*   PLATELETS Thousands/uL 173 159 223       Results from last 7 days  Lab Units 10/10/17  0935 10/09/17  1115 10/09/17  1113   GRAM STAIN RESULT  Gram positive rods  Gram negative rods  --  Gram positive rods  Gram negative rods   URINE CULTURE   --  >100,000 cfu/ml Gram Negative Andrade Enteric Like  --        Imaging Studies:   I have personally reviewed pertinent imaging study reports and images in PACS  EKG, Pathology, and Other Studies:   I have personally reviewed pertinent reports

## 2017-10-12 PROBLEM — F22 PARANOID (HCC): Status: ACTIVE | Noted: 2017-10-12

## 2017-10-12 PROBLEM — M62.82 RHABDOMYOLYSIS: Status: ACTIVE | Noted: 2017-10-12

## 2017-10-12 LAB
ANION GAP SERPL CALCULATED.3IONS-SCNC: 12 MMOL/L (ref 4–13)
ANISOCYTOSIS BLD QL SMEAR: PRESENT
BACTERIA BLD CULT: ABNORMAL
BACTERIA BLD CULT: ABNORMAL
BASOPHILS # BLD MANUAL: 0 THOUSAND/UL (ref 0–0.1)
BASOPHILS NFR MAR MANUAL: 0 % (ref 0–1)
BUN SERPL-MCNC: 37 MG/DL (ref 5–25)
CALCIUM SERPL-MCNC: 8.3 MG/DL (ref 8.3–10.1)
CHLORIDE SERPL-SCNC: 102 MMOL/L (ref 100–108)
CK MB SERPL-MCNC: 1.8 NG/ML (ref 0–5)
CK MB SERPL-MCNC: <1 % (ref 0–2.5)
CK SERPL-CCNC: 269 U/L (ref 26–192)
CO2 SERPL-SCNC: 22 MMOL/L (ref 21–32)
CREAT SERPL-MCNC: 1.52 MG/DL (ref 0.6–1.3)
EOSINOPHIL # BLD MANUAL: 0.26 THOUSAND/UL (ref 0–0.4)
EOSINOPHIL NFR BLD MANUAL: 2 % (ref 0–6)
ERYTHROCYTE [DISTWIDTH] IN BLOOD BY AUTOMATED COUNT: 15.9 % (ref 11.6–15.1)
GFR SERPL CREATININE-BSD FRML MDRD: 39 ML/MIN/1.73SQ M
GLUCOSE SERPL-MCNC: 130 MG/DL (ref 65–140)
GLUCOSE SERPL-MCNC: 131 MG/DL (ref 65–140)
GLUCOSE SERPL-MCNC: 136 MG/DL (ref 65–140)
GLUCOSE SERPL-MCNC: 143 MG/DL (ref 65–140)
GLUCOSE SERPL-MCNC: 150 MG/DL (ref 65–140)
GLUCOSE SERPL-MCNC: 156 MG/DL (ref 65–140)
GLUCOSE SERPL-MCNC: 166 MG/DL (ref 65–140)
GLUCOSE SERPL-MCNC: 220 MG/DL (ref 65–140)
GLUCOSE SERPL-MCNC: 223 MG/DL (ref 65–140)
GLUCOSE SERPL-MCNC: 260 MG/DL (ref 65–140)
GRAM STN SPEC: ABNORMAL
HCT VFR BLD AUTO: 26.8 % (ref 34.8–46.1)
HGB BLD-MCNC: 8.9 G/DL (ref 11.5–15.4)
LYMPHOCYTES # BLD AUTO: 0.39 THOUSAND/UL (ref 0.6–4.47)
LYMPHOCYTES # BLD AUTO: 3 % (ref 14–44)
MCH RBC QN AUTO: 25.6 PG (ref 26.8–34.3)
MCHC RBC AUTO-ENTMCNC: 33.2 G/DL (ref 31.4–37.4)
MCV RBC AUTO: 77 FL (ref 82–98)
MONOCYTES # BLD AUTO: 0.52 THOUSAND/UL (ref 0–1.22)
MONOCYTES NFR BLD: 4 % (ref 4–12)
NEUTROPHILS # BLD MANUAL: 11.8 THOUSAND/UL (ref 1.85–7.62)
NEUTS SEG NFR BLD AUTO: 91 % (ref 43–75)
NRBC BLD AUTO-RTO: 0 /100 WBCS
PLATELET # BLD AUTO: 178 THOUSANDS/UL (ref 149–390)
PLATELET BLD QL SMEAR: ADEQUATE
PMV BLD AUTO: 11.3 FL (ref 8.9–12.7)
POIKILOCYTOSIS BLD QL SMEAR: PRESENT
POLYCHROMASIA BLD QL SMEAR: PRESENT
POTASSIUM SERPL-SCNC: 3.4 MMOL/L (ref 3.5–5.3)
RBC # BLD AUTO: 3.48 MILLION/UL (ref 3.81–5.12)
RBC MORPH BLD: PRESENT
SODIUM SERPL-SCNC: 136 MMOL/L (ref 136–145)
WBC # BLD AUTO: 12.97 THOUSAND/UL (ref 4.31–10.16)

## 2017-10-12 PROCEDURE — G8979 MOBILITY GOAL STATUS: HCPCS

## 2017-10-12 PROCEDURE — 82553 CREATINE MB FRACTION: CPT | Performed by: NURSE PRACTITIONER

## 2017-10-12 PROCEDURE — G8978 MOBILITY CURRENT STATUS: HCPCS

## 2017-10-12 PROCEDURE — 85027 COMPLETE CBC AUTOMATED: CPT | Performed by: NURSE PRACTITIONER

## 2017-10-12 PROCEDURE — G8987 SELF CARE CURRENT STATUS: HCPCS

## 2017-10-12 PROCEDURE — G8988 SELF CARE GOAL STATUS: HCPCS

## 2017-10-12 PROCEDURE — 85007 BL SMEAR W/DIFF WBC COUNT: CPT | Performed by: NURSE PRACTITIONER

## 2017-10-12 PROCEDURE — 80048 BASIC METABOLIC PNL TOTAL CA: CPT | Performed by: NURSE PRACTITIONER

## 2017-10-12 PROCEDURE — 97166 OT EVAL MOD COMPLEX 45 MIN: CPT

## 2017-10-12 PROCEDURE — 97163 PT EVAL HIGH COMPLEX 45 MIN: CPT

## 2017-10-12 PROCEDURE — 82948 REAGENT STRIP/BLOOD GLUCOSE: CPT

## 2017-10-12 PROCEDURE — 97532 HB COGNITIVE SKILLS DEVELOPMENT: CPT

## 2017-10-12 PROCEDURE — 82550 ASSAY OF CK (CPK): CPT | Performed by: NURSE PRACTITIONER

## 2017-10-12 RX ORDER — POTASSIUM CHLORIDE 20 MEQ/1
40 TABLET, EXTENDED RELEASE ORAL ONCE
Status: DISCONTINUED | OUTPATIENT
Start: 2017-10-12 | End: 2017-10-13

## 2017-10-12 RX ORDER — OXYCODONE HYDROCHLORIDE 5 MG/1
5 TABLET ORAL EVERY 6 HOURS PRN
Status: DISCONTINUED | OUTPATIENT
Start: 2017-10-12 | End: 2017-10-13 | Stop reason: HOSPADM

## 2017-10-12 RX ORDER — INSULIN ASPART 100 [IU]/ML
40 INJECTION, SUSPENSION SUBCUTANEOUS EVERY 6 HOURS
Status: DISCONTINUED | OUTPATIENT
Start: 2017-10-12 | End: 2017-10-13 | Stop reason: HOSPADM

## 2017-10-12 RX ORDER — ACETAMINOPHEN 325 MG/1
975 TABLET ORAL EVERY 8 HOURS SCHEDULED
Status: DISCONTINUED | OUTPATIENT
Start: 2017-10-12 | End: 2017-10-13 | Stop reason: HOSPADM

## 2017-10-12 RX ADMIN — ACETAMINOPHEN 975 MG: 325 TABLET, FILM COATED ORAL at 14:13

## 2017-10-12 RX ADMIN — HEPARIN SODIUM 7500 UNITS: 5000 INJECTION, SOLUTION INTRAVENOUS; SUBCUTANEOUS at 22:57

## 2017-10-12 RX ADMIN — SODIUM CHLORIDE, SODIUM GLUCONATE, SODIUM ACETATE, POTASSIUM CHLORIDE, MAGNESIUM CHLORIDE, SODIUM PHOSPHATE, DIBASIC, AND POTASSIUM PHOSPHATE 100 ML/HR: .53; .5; .37; .037; .03; .012; .00082 INJECTION, SOLUTION INTRAVENOUS at 07:51

## 2017-10-12 RX ADMIN — OXYCODONE HYDROCHLORIDE 5 MG: 5 TABLET ORAL at 22:58

## 2017-10-12 RX ADMIN — CEFTRIAXONE SODIUM 2000 MG: 10 INJECTION, POWDER, FOR SOLUTION INTRAVENOUS at 20:42

## 2017-10-12 RX ADMIN — HEPARIN SODIUM 7500 UNITS: 5000 INJECTION, SOLUTION INTRAVENOUS; SUBCUTANEOUS at 05:45

## 2017-10-12 RX ADMIN — ACETAMINOPHEN 975 MG: 325 TABLET, FILM COATED ORAL at 21:53

## 2017-10-12 RX ADMIN — OXYCODONE HYDROCHLORIDE 5 MG: 5 TABLET ORAL at 12:48

## 2017-10-12 RX ADMIN — QUETIAPINE FUMARATE 25 MG: 25 TABLET ORAL at 22:58

## 2017-10-12 RX ADMIN — CYCLOBENZAPRINE HYDROCHLORIDE 10 MG: 10 TABLET, FILM COATED ORAL at 20:42

## 2017-10-12 RX ADMIN — HEPARIN SODIUM 7500 UNITS: 5000 INJECTION, SOLUTION INTRAVENOUS; SUBCUTANEOUS at 14:13

## 2017-10-12 RX ADMIN — SODIUM CHLORIDE 14 UNITS/HR: 9 INJECTION, SOLUTION INTRAVENOUS at 12:08

## 2017-10-12 RX ADMIN — INSULIN ASPART 40 UNITS: 100 INJECTION, SUSPENSION SUBCUTANEOUS at 19:33

## 2017-10-12 NOTE — PROGRESS NOTES
Pt  Observed turning off the IV pumps, pt  Educated in regards the matter  pt  Seems aggravated by the whole situation  Medication reinitiated, pt  Declined the continuation of IV therapy  Pt  On Isolyte and insuline gtt  Pt  educated in regards the benefits and risk of medications  Pt  Stated she understand but she was not having this medication at home and she was fine  Both nurses int he unit tried to rationalize and explain to pt  The reason why this medication should be continued, but pt  Declined and stated that she did not want any medications Iv  MD paged

## 2017-10-12 NOTE — ASSESSMENT & PLAN NOTE
· Despite correction of metabolic insults, patient continues to display paranoid behaviors  She is calling family telling them not to talk to anyone in the hospital or trust them, that we are giving her incorrect medications and trying to kill her  · Family also states she has delusions at home, such as objects flying out of the couch  · She is becoming aggressive with staff    · At this point, will place psychiatric consult

## 2017-10-12 NOTE — PHYSICAL THERAPY NOTE
Physical Therapy Evaluation    Patient's Name:Mago Duarte    Today's Date:10/12/17    Patient Active Problem List   Diagnosis    Altered mental status    Sepsis ([facility] )    Acute cystitis    Acute renal failure (ARF) ([facility] )    Cerebral meningioma ([facility] )    MARISA on CPAP    Chronic pain syndrome    DM2 (diabetes mellitus, type 2) ([place] [address] )    Continuous opioid dependence ([place] [address] )    KATIE (generalized anxiety disorder)    MDD (major depressive disorder)    Colon cancer ([facility] )    Liver cancer ([facility] )    HTN (hypertension)    HLD (hyperlipidemia)    Anemia    Chronic systolic congestive heart failure (HCC)    Status post colectomy    Pyuria    Elevated brain natriuretic peptide (BNP) level    Paranoid (HCC)    Rhabdomyolysis       Past Medical History:   Diagnosis Date    Cancer ([place] [address] )     colon    CHF (congestive heart failure) ([facility] )     Diabetes mellitus ([place] [address] )     Hypertension     Psychiatric disorder        Past Surgical History:   Procedure Laterality Date    COLON SURGERY          10/12/17 0940   Note Type   Note type Eval only   Pain Assessment   Pain Assessment No/denies pain   Pain Score No Pain   Home Living   Type of [address] level;Stairs to enter with rails   Home Equipment Cane   Additional Comments Pt lives in 1st floor apartment alone with 2 TJ  Prior Function   Level of [place] Independent with ADLs and functional mobility   Lives With Alone   Receives Help From Friend(s)   ADL Assistance Independent   IADLs Independent   Falls in the last 6 months 1 to 4   Vocational On disability   Restrictions/Precautions   Weight Bearing Precautions Per Order No   Other Precautions Cognitive; Fall Risk   Cognition   Overall Cognitive Status Impaired   Arousal/Participation Alert   Orientation Level Oriented to place;Oriented to person;Disoriented to situation;Disoriented to time   Following Commands Follows one step commands with increased time or repetition   RLE Assessment RLE Assessment X  (Pt overall strength 3+/5 in RLE assessed with ambulation )   LLE Assessment   LLE Assessment X  (Pt overall strength 3+/5 in LLE assessed with ambulatin )   Coordination   Movements are Fluid and Coordinated 0   Sensation X   Wound 10/10/17 Moisture associated skin damage Sacrum Inner;Mid Linear - partial thickness slit related to moisture    Date First Assessed: 10/10/17   Pre-Existing Wound: Yes  Wound Type: Moisture associated skin damage  Location: Sacrum  Wound Location Orientation: Inner;Mid  Wound Description (Comments): Linear - partial thickness slit related to moisture    Wound Description Clean; Intact; Bleeding   Tara-wound Assessment Clean;Dry; Intact; Erythema   Drainage Amount None   Dressing Open to air   Bed Mobility   Sit to Supine 5  Supervision   Additional items Impulsive   Additional Comments Pt was sitting up in chair upon arrival   Transfers   Sit to Stand 5  Supervision   Additional items Impulsive   Stand to Sit 5  Supervision   Additional items Impulsive   Ambulation/Elevation   Gait pattern Improper Weight shift; Wide MARQUES; Decreased foot clearance; Short stride   Gait Assistance 4  Minimal assist   Additional items Assist x 1   Assistive Device None  (Pt was reaching for things when walking but refused cane )   Distance 20'   Balance   Static Sitting Good   Dynamic Sitting Fair +   Static Standing Fair   Dynamic Standing Fair   Ambulatory Fair   Endurance Deficit   Endurance Deficit Yes   Activity Tolerance   Activity Tolerance Patient limited by fatigue   Assessment   Prognosis Fair   Problem List Decreased strength;Decreased endurance; Impaired balance;Decreased mobility; Decreased cognition; Impaired judgement;Decreased safety awareness   Assessment Pt is a 48 y/o female who was admitted s/p fall and altered mental status  Pt was admitted for BP management for septic shock   Pt has a history of CHF, cerebral meningioma, MARISA, type 2 DM, colon CA, HTN, HLD, depression, and anxiety  PTA, pt was (I) with ADLs and was ambulating with cane  During IE, pt was able to perform transfers with S and performed bed mobility with S  Pt was getting aggitated throughout session and at first refused to ambulate until later  Pt was found ambulating in her room holding onto things to get around  Pt was able to ambulate 20' with min A in the hallway  Pt was educated to call for RN when wanting to ambulate for safety  Pt would benefit from continued physical therapy sessions to improve balance, strength, and endurance  Recommend rehab upon d/c  Goals   Patient Goals Return home   STG Expiration Date 10/22/17   Short Term Goal #1 Pt will be able to ambulate 100' with most appropriate AD and S; pt will perform transfers (I)ly; pt will perform bed mobility (I)ly; pt will improve standing balance to fair +   Treatment Day 0   Plan   Treatment/Interventions Functional transfer training;LE strengthening/ROM; Elevations; Therapeutic exercise; Endurance training;Bed mobility;Gait training   PT Frequency 5x/wk   Recommendation   Recommendation Short-term skilled PT   PT - OK to Discharge (when medically stable to rehab)   Modified Glencoe Scale   Modified Glencoe Scale 3   Barthel Index   Feeding 10   Bathing 0   Grooming Score 5   Dressing Score 5   Bladder Score 10   Bowels Score 10   Toilet Use Score 5   Transfers (Bed/Chair) Score 10   Mobility (Level Surface) Score 0   Stairs Score 0   Barthel Index Score 55   Chio Hicks, SPT

## 2017-10-12 NOTE — PROGRESS NOTES
Progress Note - Srini Tubbs 47 y o  female MRN: [id number]    Unit/Bed#: -01 Encounter: [id number]    Dispo: Mother concerned patient unfit to live at home by herself  She is noncompliant with her medications, including insulin, requiring frequent hospitalizations  She has a two story place but is unable to go to the second floor to use the bathroom so uses a bedside commode  Additionally, she is exhibiting paranoid behavior  I question her capacity to make medical decisions  Will place neuropsych consult for competency  * Sepsis ([facility] )   Assessment & Plan    · Septic shock requiring pressors in the ICU  · Likely secondary to urosepsis  Urine culture and blood cultures both growing Klebsiella  · Patient on Rocephin and vancomycin  · ID following        Paranoid ([facility] )   Assessment & Plan    · Despite correction of metabolic insults, patient continues to display paranoid behaviors  She is calling family telling them not to talk to anyone in the hospital or trust them, that we are giving her incorrect medications and trying to kill her  · Family also states she has delusions at home, such as objects flying out of the couch  · She is becoming aggressive with staff  · At this point, will place psychiatric consult          DM2 (diabetes mellitus, type 2) ([facility] )   Assessment & Plan    · With DKA present on admission  · Family reports patient non-compliant with insulin regimen  · Presently on insulin drip  Will consult endocrine for management  · Patient's mother states she is on Humalog 75/25 at home, 75U at breakfast and 80U with dinner        Acute renal failure (ARF) ([facility])   Assessment & Plan    · Likely prerenal azotemia/ACEI/NSAID use, rhabdo  · Creatinine trended down with IVFs  · Patient now eating and drinking, fluids stopped        Rhabdomyolysis   Assessment & Plan    · CK trending down  IVFs discontinued          Chronic systolic congestive heart failure (HCC)   Assessment & Plan    · EF 35% on echo on outside records however echo here with EF 50-55% on 10/10/17  · Monitor fluid status closely  Appears euvolemic        HLD (hyperlipidemia)   Assessment & Plan    · Statin has been on hold due to rhabdo        HTN (hypertension)   Assessment & Plan    · ACEI on hold due to JORDAN        Colon cancer [facility])   Assessment & Plan    · with history of colon adenocarcinoma s/p ex lap with transverse colectomy 7/26/17 with Dr Sharyn Barney  MARISA on CPAP   Assessment & Plan    · Noncompliant on CPAP          VTE Pharmacologic Prophylaxis:   Pharmacologic: Heparin  Mechanical VTE Prophylaxis in Place: Yes    Patient Centered Rounds: I have performed bedside rounds with nursing staff today  Discussions with Specialists or Other Care Team Provider: None    Education and Discussions with Family / Patient: 30 minute conversation with patient's mother  Time Spent for Care: 90 minutes  More than 50% of total time spent on counseling and coordination of care as described above  Reviewed all notes from ICU and past hospitalizations  Spent an hour between talking with family and coordinating care  Current Length of Stay: 3 day(s)    Current Patient Status: Inpatient   Certification Statement: The patient will continue to require additional inpatient hospital stay due to IV antibiotics, insulin drip  Discharge Plan: I anticipate she will not be medically stable for a few days  Discharge likely to assisted living or possibly inpatient psych  Code Status: Level 1 - Full Code      Subjective:   Patient ripping out IVs and refusing insulin drip/fluids  Agreeable to insulin drip after talking with her  Agreed that fluids can be stopped if she is eating and drinking  Exhibiting paranoid behaviors  Called her family and told them not to listen to us, that we are trying to kill her, give her incorrect medications, and that she is in the drunk tank   Said that hospital is trying to kill her and that they should jaydon the hospital     Objective:     Vitals:   Temp (24hrs), Av 3 °F (36 8 °C), Min:97 9 °F (36 6 °C), Max:98 7 °F (37 1 °C)    HR:  [102-104] 102  Resp:  [18-24] 18  BP: (117-129)/(52-62) 129/62  SpO2:  [94 %] 94 %  Body mass index is 52 28 kg/m²  Input and Output Summary (last 24 hours): Intake/Output Summary (Last 24 hours) at 10/12/17 1118  Last data filed at 10/11/17 2224   Gross per 24 hour   Intake          2313 54 ml   Output                0 ml   Net          2313 54 ml       Physical Exam:     Physical Exam   Constitutional: No distress  Obese   HENT:   Head: Normocephalic and atraumatic  Eyes: No scleral icterus  Neck: Normal range of motion  Neck supple  Cardiovascular: Normal rate, regular rhythm and normal heart sounds  Pulmonary/Chest: Effort normal and breath sounds normal  No respiratory distress  She has no wheezes  She has no rales  Abdominal: Soft  Bowel sounds are normal  She exhibits no distension  There is no tenderness  There is no rebound  Musculoskeletal: She exhibits no edema  Neurological: She is alert  Skin: Skin is warm and dry  She is not diaphoretic  Psychiatric:   Paranoid  Additional Data:     Labs:      Results from last 7 days  Lab Units 10/12/17  0534   WBC Thousand/uL 12 97*   HEMOGLOBIN g/dL 8 9*   HEMATOCRIT % 26 8*   PLATELETS Thousands/uL 178   LYMPHO PCT % 3*   MONO PCT MAN % 4   EOSINO PCT MANUAL % 2       Results from last 7 days  Lab Units 10/12/17  0534  10/10/17  2210   SODIUM mmol/L 136  < > 136   POTASSIUM mmol/L 3 4*  < > 3 9   CHLORIDE mmol/L 102  < > 103   CO2 mmol/L 22  < > 21   BUN mg/dL 37*  < > 54*   CREATININE mg/dL 1 52*  < > 3 07*   CALCIUM mg/dL 8 3  < > 7 7*   TOTAL PROTEIN g/dL  --   --  6 3*   BILIRUBIN TOTAL mg/dL  --   --  0 57   ALK PHOS U/L  --   --  125*   ALT U/L  --   --  23   AST U/L  --   --  59*   GLUCOSE RANDOM mg/dL 166*  < > 185*   < > = values in this interval not displayed      Results from last 7 days  Lab Units 10/09/17  1113   INR  1 29*       * I Have Reviewed All Lab Data Listed Above  * Additional Pertinent Lab Tests Reviewed: [facility] Admission Reviewed    Imaging:    Imaging Reports Reviewed Today Include: CXR  Imaging Personally Reviewed by Myself Includes:  CXR    Recent Cultures (last 7 days):       Results from last 7 days  Lab Units 10/11/17  1047 10/11/17  0541 10/09/17  1115 10/09/17  1113   BLOOD CULTURE  Klebsiella pneumoniae* No Growth at 24 hrs   --  Klebsiella pneumoniae*   GRAM STAIN RESULT  Gram positive rods  Gram negative rods  --   --  Gram positive rods  Gram negative rods   URINE CULTURE   --   --  >100,000 cfu/ml Klebsiella pneumoniae*  --        Last 24 Hours Medication List:     aspirin 81 mg Oral Daily   cefTRIAXone 2,000 mg Intravenous Q24H   diazepam 10 mg Intravenous Once   DULoxetine 60 mg Oral Daily   famotidine 20 mg Oral Daily   heparin (porcine) 7,500 Units Subcutaneous Q8H [address]   potassium chloride 40 mEq Oral Once   QUEtiapine 25 mg Oral HS   senna 1 tablet Oral HS   vancomycin 1,500 mg Intravenous Q12H        Today, Patient Was Seen By: Matti Francis PA-C    ** Please Note: Dragon 360 Dictation voice to text software may have been used in the creation of this document   **

## 2017-10-12 NOTE — PROGRESS NOTES
10/12/17 [address] Involvement Patient not active with [facility]   Spiritual Beliefs/Perceptions   Concept of God Accepting   God's Role in Disease God's will   Relationship with God Close   Support Systems Friends/neighbors   Psychosocial   Patient Behaviors/Mood Anxious   Length of Time/Family Visitation 31 min -1hr   Stress Factors   Patient Stress Factors Health changes; Loss of control   Coping Responses   Patient Coping Fearful   Plan of Care   Care Plan Initiated Yes   Provide Spiritual Support (Visits) 2 times;per week   Comments (Provided prayers with PT   PT requests visit fr )   Assessment Completed by: Unit visit     Trent Maravilla

## 2017-10-12 NOTE — PROGRESS NOTES
Progress Note - Nephrology   Jaswinder Phillips 47 y o  female MRN: [id number]  Unit/Bed#: - Encounter: [id number]      Assessment / Plan:  1  JORDAN likely prerenal in setting of ACE/NSAID use with septic ATN +/- hypoperfusion +- rhabdo  -sCr improving from peak 7, down to 1 56, b/l sCr ~1  -off IVF as patient refuses this and states she can eat and drink well  -continue to treat sepsis  -continue holding ACEi  -UA with fine granular casts suggestive of some underlying possible ATN as well  -monitor renal indices    2  Hypokalemia - possibly due to NS administration, 40meq Kdur given this am  Monitor K/Mag level  3  Anemia - Hgb in high 8s-9s, continue to monitor H&H    4  Metabolic acidosis d/t JORDAN/DKA - resolving    5  Hypotension - off IVF, BP improved yesterday  She refused further BP checks today  6  Sepsis ? D/t urosepsis with gram negative bacteremia - per ID     7  Anxiety/depression/paranoia     Will sign off at this time  She is refusing IV and BP checks  - Would continue holding ACEi  She should avoid further NSAID use  If average SBP > 140s, would start low dose coreg  She should have outpatient follow up with her PCP and repeat BMP in 1 week upon discharge to ensure sCr has normalized  Subjective:   She denies any chest pain or shortness of breath, nausea or vomiting  She stopped her IV fluids overnight and she felt that she did need them anymore  She thinks that she is getting a bit swollen tells me she has a history of heart failure  She has no other complaints  Objective:     Vitals: Blood pressure 129/62, pulse 102, temperature 98 7 °F (37 1 °C), temperature source Oral, resp  rate 18, height 5' 4" (1 626 m), weight (!) 138 kg (304 lb 9 6 oz), SpO2 94 %  ,Body mass index is 52 28 kg/m²  Temp (24hrs), Av 3 °F (36 8 °C), Min:97 9 °F (36 6 °C), Max:98 7 °F (37 1 °C)      Weight (last 2 days)     Date/Time   Weight    10/12/17 0542  (!)  138 (304 6)    10/11/17 0541  (!)  146 (320 99)                Intake/Output Summary (Last 24 hours) at 10/12/17 1120  Last data filed at 10/11/17 2224   Gross per 24 hour   Intake          2313 54 ml   Output                0 ml   Net          2313 54 ml     I/O last 24 hours:   In: 3394 9 [P O :1360; I V :1784 9; IV Piggyback:250]  Out: 350 [Urine:350]        Physical Exam:   Gen: NAD, obese  Neck: supple  CV: +s1s2, no friction rub  Pulm: clear b/l  Abdomen: soft, ND, NT  Ext: +edema  Neuro: awake, alert  Skin: warm, dry    Invasive Devices     Peripheral Intravenous Line            Peripheral IV 10/12/17 Right Antecubital less than 1 day                Medications:    Scheduled Meds:  aspirin 81 mg Oral Daily   cefTRIAXone 2,000 mg Intravenous Q24H   diazepam 10 mg Intravenous Once   DULoxetine 60 mg Oral Daily   famotidine 20 mg Oral Daily   heparin (porcine) 7,500 Units Subcutaneous Q8H [facility]   potassium chloride 40 mEq Oral Once   QUEtiapine 25 mg Oral HS   senna 1 tablet Oral HS   vancomycin 1,500 mg Intravenous Q12H       PRN Meds:   acetaminophen    acetaminophen    aluminum-magnesium hydroxide-simethicone    cyclobenzaprine    ondansetron    polyethylene glycol    sodium chloride (PF)    Continuous Infusions:  insulin regular (HumuLIN R,NovoLIN R) infusion 0 3-21 Units/hr Last Rate: Stopped (10/12/17 0753)   multi-electrolyte 100 mL/hr Last Rate: Stopped (10/12/17 0752)           LAB RESULTS:        Results from last 7 days  Lab Units 10/12/17  0534 10/11/17  0436 10/11/17  0057 10/10/17  2210 10/10/17  1641 10/10/17  1222 10/10/17  0433 10/10/17  0431  10/09/17  2027 10/09/17  1123 10/09/17  1113   WBC Thousand/uL 12 97* 14 74*  --  7 26  --   --   --  17 70*  --   --   --  17 24*   HEMOGLOBIN g/dL 8 9* 9 0*  --  8 9*  --   --   --  9 5*  --   --   --  10 8*   I STAT HEMOGLOBIN g/dl  --   --   --   --   --   --   --   --   --   --  12 2  --    HEMATOCRIT % 26 8* 27 2*  --  27 2*  --   --   --  29 1*  --   --   --  33 2*   PLATELETS Thousands/uL 178 173  --  159  --   --   --  223  --  239  --  273   LYMPHO PCT % 3*  --   --  4*  --   --   --   --   --   --   --  2*   MONO PCT MAN % 4  --   --  1*  --   --   --   --   --   --   --  4   EOSINO PCT MANUAL % 2  --   --  2  --   --   --   --   --   --   --  0   SODIUM mmol/L 136 136 137 136 136 135* 134* 132*  < >  --   --  127*   POTASSIUM mmol/L 3 4* 4 1 3 4* 3 9 4 1 3 6 3 8 3 8  < >  --   --  4 9   CHLORIDE mmol/L 102 103 102 103 103 101 99* 99*  < >  --   --  93*   CO2 mmol/L 22 24 23 21 22 22 24 23  < >  --   --  19*   BUN mg/dL 37* 50* 50* 54* 56* 64* 69* 69*  < >  --   --  72*   CREATININE mg/dL 1 52* 2 65* 2 92* 3 07* 3 67* 4 18* 5 33* 5 36*  < >  --   --  6 89*   CALCIUM mg/dL 8 3 7 8* 7 4* 7 7* 8 0* 8 0* 8 3 8 0*  < >  --   --  8 8   ALBUMIN g/dL  --   --   --  2 3*  --   --   --  2 7*  --   --   --  2 9*   TOTAL PROTEIN g/dL  --   --   --  6 3*  --   --   --  6 8  --   --   --  7 5   BILIRUBIN TOTAL mg/dL  --   --   --  0 57  --   --   --  0 44  --   --   --  0 63   ALK PHOS U/L  --   --   --  125*  --   --   --  91  --   --   --  108   ALT U/L  --   --   --  23  --   --   --  19  --   --   --  21   AST U/L  --   --   --  59*  --   --   --  38  --   --   --  40   GLUCOSE RANDOM mg/dL 166* 140 216* 185* 184* 189* 293* 285*  < >  --   --  491*   GLUCOSE, ISTAT mg/dl  --   --   --   --   --   --   --   --   --   --  492*  --    MAGNESIUM mg/dL  --  2 1  --   --   --   --   --  1 8  --   --   --   --    PHOSPHORUS mg/dL  --  2 7  --   --   --   --   --  4 5  --   --   --   --    < > = values in this interval not displayed  CUTURES:  Lab Results   Component Value Date    BLOODCX Klebsiella pneumoniae (A) 10/11/2017    BLOODCX No Growth at 24 hrs  10/11/2017    BLOODCX Klebsiella pneumoniae (A) 10/09/2017    URINECX >100,000 cfu/ml Klebsiella pneumoniae (A) 10/09/2017                 Portions of the record may have been created with voice recognition software   Occasional wrong word or "sound a like" substitutions may have occurred due to the inherent limitations of voice recognition software  Read the chart carefully and recognize, using context, where substitutions have occurred  If you have any questions, please contact the dictating provider

## 2017-10-12 NOTE — CONSULTS
Consultation - Rashida Hung 47 y o  female MRN: [id number]    Unit/Bed#: -01 Encounter: [id number]      Assessment/Plan     Assessment: This is a 47y o -year-old female with diabetes with hyperglycemia  Plan:  1  Type 2 diabetes with severe hyperglycemia as evident by blood sugars greater than 200-change to subcu insulin with NovoLog 70/30 40 units every 6 hours  Stop IV insulin 1 hour after injection  Continue to monitor blood sugar over time and make adjustments to the regimen if necessary  While on IV insulin, check blood sugar every 2 hours  Check urine for microalbumin  CC: Diabetes Consult    History of Present Illness     HPI: Rashida Hung is a 47y o  year old female with type 2 diabetes for 11 years  She is on insulin at home  She denies any polyuria, polydipsia, nocturia and blurry vision  She denies retinopathy, heart attack, stroke and claudication but does admit to neuropathy and nephropathy  She admits to hypoglycemia twice per week at home  She takes Humalog 75/25 65 to 85 units twice a day  She gets hypoglycemia in the middle of the day  Inpatient consult to Endocrinology  [place] [facility] [place]  Consult ordered by: Saima Hopkins          Review of Systems   HENT: Negative for congestion  Gastrointestinal: Negative for abdominal distention  Endocrine: Negative for cold intolerance, heat intolerance, polydipsia, polyphagia and polyuria  Musculoskeletal: Negative for arthralgias  Psychiatric/Behavioral: Negative for agitation         Historical Information   Past Medical History:   Diagnosis Date    Cancer [facility])     colon    [facility]     Diabetes mellitus [facility]     Hypertension     Psychiatric disorder      Past Surgical History:   Procedure Laterality Date    COLON SURGERY       Social History   History   Alcohol Use No     History   Drug Use No     History   Smoking Status    Never Smoker   Smokeless Tobacco  Never Used     Family History: History reviewed  No pertinent family history      Meds/Allergies   Current Facility-Administered Medications   Medication Dose Route Frequency Provider Last Rate Last Dose    acetaminophen (TYLENOL) tablet 650 mg  650 mg Oral Q6H PRN Reji Barkley MD   650 mg at 10/10/17 1822    acetaminophen (TYLENOL) tablet 975 mg  975 mg Oral [facility] Alicia Jacobs PA-C   975 mg at 10/12/17 1413    aluminum-magnesium hydroxide-simethicone (MYLANTA) 200-200-20 mg/5 mL oral suspension 30 mL  30 mL Oral Q6H PRN Pia Carrasco PA-C        aspirin chewable tablet 81 mg  81 mg Oral Daily Shaun Carrasco PA-C   81 mg at 10/10/17 0805    cefTRIAXone (ROCEPHIN) 2,000 mg in dextrose 5 % 50 mL IVPB  2,000 mg Intravenous Q24H Micki Aldea,  mL/hr at 10/11/17 2130 2,000 mg at 10/11/17 2130    cyclobenzaprine (FLEXERIL) tablet 10 mg  10 mg Oral TID PRN Reji Barkley MD   10 mg at 10/11/17 1255    diazepam (VALIUM) injection 10 mg  10 mg Intravenous Once Reji Barkley MD        DULoxetine (CYMBALTA) delayed release capsule 60 mg  60 mg Oral Daily Reji Barkley MD   60 mg at 10/11/17 1139    famotidine (PEPCID) tablet 20 mg  20 mg Oral Daily Reji Barkley MD        heparin (porcine) subcutaneous injection 7,500 Units  7,500 Units Subcutaneous [facility] Reji Barkley MD   7,500 Units at 10/12/17 1413    insulin regular (HumuLIN R,NovoLIN R) 1 Units/mL in sodium chloride 0 9 % 100 mL infusion  0 3-21 Units/hr Intravenous Titrated [place] Deannaing, DO 4 mL/hr at 10/12/17 1705 4 Units/hr at 10/12/17 1705    ondansetron (ZOFRAN) injection 4 mg  4 mg Intravenous Q6H PRN Shaun Carrasco PA-C        oxyCODONE (ROXICODONE) IR tablet 5 mg  5 mg Oral Q6H PRN Alicia Jacobs PA-C   5 mg at 10/12/17 1248    polyethylene glycol (MIRALAX) packet 17 g  17 g Oral Daily PRN Reji Barkley MD        potassium chloride (K-DUR,KLOR-CON) CR tablet 40 mEq  40 mEq Oral Once [address], DO        QUEtiapine (SEROquel) tablet 25 mg  25 mg Oral HS Ehtel Franco MD   25 mg at 10/11/17 2127    senna (SENOKOT) tablet 8 6 mg  1 tablet Oral HS Ethel Franco MD   8 6 mg at 10/11/17 2127    sodium chloride (PF) 0 9 % injection 3 mL  3 mL Intravenous PRN Josiane Newberry MD         No Known Allergies    Objective   Vitals: Blood pressure 123/60, pulse 103, temperature 98 5 °F (36 9 °C), temperature source Oral, resp  rate 20, height 5' 4" (1 626 m), weight (!) 138 kg (304 lb 9 6 oz), SpO2 95 %  Intake/Output Summary (Last 24 hours) at 10/12/17 1759  Last data filed at 10/12/17 1731   Gross per 24 hour   Intake          1593 33 ml   Output                0 ml   Net          1593 33 ml     Invasive Devices     Peripheral Intravenous Line            Peripheral IV 10/12/17 Right Antecubital less than 1 day                Physical Exam   Constitutional: She is oriented to person, place, and time  She appears well-developed and well-nourished  No distress  Obese woman in bed in no apparent distress  HENT:   Head: Normocephalic and atraumatic  Mouth/Throat: Oropharynx is clear and moist and mucous membranes are normal  No oropharyngeal exudate  Eyes: Conjunctivae, EOM and lids are normal  Right eye exhibits no discharge  Left eye exhibits no discharge  No scleral icterus  Neck: Neck supple  No thyromegaly present  Cardiovascular: Normal rate, regular rhythm and normal heart sounds  Exam reveals no gallop and no friction rub  No murmur heard  Pulmonary/Chest: Effort normal and breath sounds normal  No respiratory distress  She has no wheezes  Abdominal: Soft  Bowel sounds are normal  She exhibits no distension  There is no tenderness  Musculoskeletal: Normal range of motion  She exhibits no edema, tenderness or deformity  Lymphadenopathy:        Head (right side): No occipital adenopathy present  Head (left side): No occipital adenopathy present          Right: No supraclavicular adenopathy present  Left: No supraclavicular adenopathy present  Neurological: She is alert and oriented to person, place, and time  No cranial nerve deficit  Skin: Skin is warm and intact  She is not diaphoretic  No erythema  Psychiatric: She has a normal mood and affect  Vitals reviewed  The history was obtained from the review of the chart, patient  Lab Results:     Results from last 7 days  Lab Units 10/10/17  0431   HEMOGLOBIN A1C % 10 0*     Lab Results   Component Value Date    WBC 12 97 (H) 10/12/2017    HGB 8 9 (L) 10/12/2017    HCT 26 8 (L) 10/12/2017    MCV 77 (L) 10/12/2017     10/12/2017     Lab Results   Component Value Date/Time    BUN 37 (H) 10/12/2017 05:34 AM     10/12/2017 05:34 AM    K 3 4 (L) 10/12/2017 05:34 AM     10/12/2017 05:34 AM    CO2 22 10/12/2017 05:34 AM    CREATININE 1 52 (H) 10/12/2017 05:34 AM    AST 59 (H) 10/10/2017 10:10 PM    ALT 23 10/10/2017 10:10 PM     No results for input(s): CHOL, HDL, LDL, TRIG, VLDL in the last 72 hours  No results found for: [address]  POC Glucose (mg/dl)   Date Value   10/12/2017 131   10/12/2017 223 (H)   10/12/2017 260 (H)   10/12/2017 220 (H)   10/12/2017 156 (H)   10/12/2017 150 (H)   10/12/2017 130   10/12/2017 136   10/11/2017 149 (H)   10/11/2017 178 (H)       Imaging Studies: I have personally reviewed pertinent reports  Chest x-ray done on the 10 shows right IJ catheter  Portions of the record may have been created with voice recognition software

## 2017-10-12 NOTE — CONSULTS
Consultation - 26 Mellissa García 47 y o  female MRN: [id number]  Unit/Bed#: -01 Encounter: [id number]      Chief Complaint: I am feeling better  I cannot remember a lot of things  History of Present Illness   Physician Requesting Consult: Shimon Mayberry MD  Reason for Consult / Principal Problem: Paranoid    Zoya Vega is a 47 y o  female presents after being found confused and on the ground at home by a family friend  She states that she fell twice and her friend called 911  She does not remember everything that happened after she was admitted to the hospital  She does remember being in [place] and discharged in August  She was told that she has congestive heart failure  Patient has multiple medical problems and has poor compliance with treatment  She remembered that she went to a clinic and was told she had a UTI and was given some prescription  She has a follow up appointment tomorrow  Patient does not remember talking to her mother  She states that the last time she saw her was in June because the mother lives in [facility]  She has son who left to college in August  Patient lives alone  Patient sees a psychiatrist for depression and anxiety  Patient states that she started having visual hallucinations and vivid dreams when she started Lyrica one year ago  She denies any suicidal and homicidal thoughts, plan or intent  Patient at this moment is not paranoid  She does not feel that she is in danger in the hospital  She is taking her medications as given and was very cooperative with the interview  Psychiatric Review Of Systems:  sleep: no  appetite changes: no  weight changes: yes  energy/anergy: no  interest/pleasure/anhedonia: no  somatic symptoms: no  anxiety/panic: no  laurent: no  guilty/hopeless: no  self injurious behavior/risky behavior: no    Historical Information   Past Psychiatric History:   She follows up with a psychiatrist, Dr Sita Shrestha   She denies any inpatient admissions  Currently in treatment with Dr Fernando Dominguez  Past Suicide attempts: none  Past Violent behavior: none  Past Psychiatric medication trial: Cymbalta, Clonazepam     Substance Abuse History:  She denies any drugs or alcohol  I have assessed this patient for substance use within the past 12 months     History of IP/OP rehabilitation program: none  Smoking history: never smoked  Family Psychiatric History:   Alcohol use in both sides of the family    Social History  Education: some college  Learning Disabilities: none  Marital history: single  Living arrangement, social support: Patient lives alone     Occupational History: on permanent disability  Functioning Relationships: good support system  Other Pertinent History: None    Traumatic History:   Abuse: She denies  Other Traumatic Events: none    Past Medical History:   Diagnosis Date    [facility]     colon    [facility]     Diabetes mellitus ([facility]     Hypertension     Psychiatric disorder        Medical Review Of Systems:  Review of Systems - Negative except pain in the abdominal area, difficulty ambulating  All other systems reviewed and they are negative       Meds/Allergies   all current active meds have been reviewed and current meds:   Current Facility-Administered Medications   Medication Dose Route Frequency    acetaminophen (TYLENOL) tablet 650 mg  650 mg Oral Q6H PRN    acetaminophen (TYLENOL) tablet 975 mg  975 mg Oral Q8H [address]    aluminum-magnesium hydroxide-simethicone (MYLANTA) 200-200-20 mg/5 mL oral suspension 30 mL  30 mL Oral Q6H PRN    aspirin chewable tablet 81 mg  81 mg Oral Daily    cefTRIAXone (ROCEPHIN) 2,000 mg in dextrose 5 % 50 mL IVPB  2,000 mg Intravenous Q24H    cyclobenzaprine (FLEXERIL) tablet 10 mg  10 mg Oral TID PRN    diazepam (VALIUM) injection 10 mg  10 mg Intravenous Once    DULoxetine (CYMBALTA) delayed release capsule 60 mg  60 mg Oral Daily    famotidine (PEPCID) tablet 20 mg 20 mg Oral Daily    heparin (porcine) subcutaneous injection 7,500 Units  7,500 Units Subcutaneous Q8H [address]    insulin regular (HumuLIN R,NovoLIN R) 1 Units/mL in sodium chloride 0 9 % 100 mL infusion  0 3-21 Units/hr Intravenous Titrated    ondansetron (ZOFRAN) injection 4 mg  4 mg Intravenous Q6H PRN    oxyCODONE (ROXICODONE) IR tablet 5 mg  5 mg Oral Q6H PRN    polyethylene glycol (MIRALAX) packet 17 g  17 g Oral Daily PRN    potassium chloride (K-DUR,KLOR-CON) CR tablet 40 mEq  40 mEq Oral Once    QUEtiapine (SEROquel) tablet 25 mg  25 mg Oral HS    senna (SENOKOT) tablet 8 6 mg  1 tablet Oral HS    sodium chloride (PF) 0 9 % injection 3 mL  3 mL Intravenous PRN    vancomycin (VANCOCIN) 1,500 mg in sodium chloride 0 9 % 250 mL IVPB  1,500 mg Intravenous Q12H     No Known Allergies    Objective   Vital signs in last 24 hours:  Temp:  [98 7 °F (37 1 °C)] 98 7 °F (37 1 °C)  HR:  [102] 102  Resp:  [18] 18  BP: (129)/(62) 129/62      Intake/Output Summary (Last 24 hours) at 10/12/17 1243  Last data filed at 10/11/17 2224   Gross per 24 hour   Intake             1550 ml   Output                0 ml   Net             1550 ml       Mental Status Evaluation:  Appearance:  age appropriate and overweight   Behavior:  cooperative   Speech:  soft   Mood:  sad   Affect:  mood-congruent   Language: naming objects and repeating phrases   Thought Process:  goal directed   Associations: intact associations   Thought Content:  normal   Perceptual Disturbances: None   Risk Potential: She denies any suicidal thoughts plan or intent   Sensorium:  person, place and time/date   Memory:  recent and remote memory grossly intact   Cognition:  grossly intact   Consciousness:  alert and awake    Attention: attention span appeared shorter than expected for age   Intellect: normal   Fund of Knowledge: awareness of current events: fair   Insight:  fair   Judgment: fair   Muscle Strength and Tone: within normal limits   Gait/Station: slow   Motor Activity: no abnormal movements     Lab Results:    Lab Results   Component Value Date    WBC 12 97 (H) 10/12/2017    HGB 8 9 (L) 10/12/2017    HCT 26 8 (L) 10/12/2017    MCV 77 (L) 10/12/2017     10/12/2017     Lab Results   Component Value Date    GLUCOSE 166 (H) 10/12/2017    CALCIUM 8 3 10/12/2017     10/12/2017    K 3 4 (L) 10/12/2017    CO2 22 10/12/2017     10/12/2017    BUN 37 (H) 10/12/2017    CREATININE 1 52 (H) 10/12/2017         Code Status: )Level 1 - Full Code    Assessment/Plan     Assessment:  Velvet Cope is a 47 y o  female with multiple medical problems who was admitted after a fall  Patient has been paranoid at times  At this moment the patient is not paranoid and does not have any hallucinations  Patient has been in delirium and this can result in paranoia  Diagnosis: Major depressive disorder, moderate, without psychotic features F33 1    Plan:   Continue medical management  At this moment the patient does not need inpatient psychiatric admission  Discussed with primary team  I will sign off  Contact me back if necessary  Risks, benefits and possible side effects of Medications:   Risks, benefits, and possible side effects of medications explained to patient and patient verbalizes understanding             Carlos Alberto Rocha MD

## 2017-10-12 NOTE — PLAN OF CARE
SAFETY,RESTRAINT: NV/NON-SELF DESTRUCTIVE BEHAVIOR     Returns to optimal restraint-free functioning Not Progressing          DISCHARGE PLANNING - CARE MANAGEMENT     Discharge to post-acute care or home with appropriate resources Progressing        INFECTION - ADULT     Absence or prevention of progression during hospitalization Progressing     Absence of fever/infection during neutropenic period Progressing        Nutrition/Hydration-ADULT     Nutrient/Hydration intake appropriate for improving, restoring or maintaining nutritional needs Progressing        PAIN - ADULT     Verbalizes/displays adequate comfort level or baseline comfort level Progressing        Potential for Falls     Patient will remain free of falls Progressing        Prexisting or High Potential for Compromised Skin Integrity     Skin integrity is maintained or improved Progressing        SAFETY ADULT     Maintain or return to baseline ADL function Progressing     Maintain or return mobility status to optimal level Progressing        SAFETY,RESTRAINT: NV/NON-SELF DESTRUCTIVE BEHAVIOR     Remains free of harm/injury (restraint for non violent/non self-detsructive behavior) Progressing

## 2017-10-12 NOTE — PLAN OF CARE
Problem: OCCUPATIONAL THERAPY ADULT  Goal: Performs self-care activities at highest level of function for planned discharge setting  See evaluation for individualized goals  See flowsheet documentation for full assessment, interventions and recommendations  Limitation: Decreased ADL status, Decreased Safe judgement during ADL, Decreased cognition, Decreased fine motor control  Prognosis: Fair  Assessment: FALL W/ LOC AND ALTERED MENTATION/CONFUSION, & TACHYCARDIA  PT DX WITH SEVERE SEPSIS, ACUTE RENAL FAILURE, ACUTE RHABDOMYOLYSIS & ACUTE TOXIC METABOLIC ENCEPHALOPATHY  PMH INCLUDES CHF, DM , CHRONIC PAIN SYNDROME, KATIE, MDD, OPOID DEPENDENCE, COLON CANCER, GERD, HTN, HLD, ANEMA, S/P COLECTOMY FOR ISCHEMIC BOWEL 7/17 AT [facility]  PT LIVES ALONE IN FIRST FLOOR APARTMENT  PT REPORTS PREVIOUS INDEPENDENCE IN ADLS AND RECEIVES HELP FROM FAMILY FOR IADLS  PT USED CANE AT BASELINE FOR FUNCTIONAL AND COMMUNITY MOBILITY  PT REPORTS 2-3 FALLS IN PAST 6 MO   PT CURRENTLY REQUIRES SUPERVISION FOR UB ADLS AND MIN A FOR LB ADLS 2* IMPULSIVITY, DECREASED SAFETY AWARENESS, AND DECREASED COGNITIVE STATUS  PT LIMITED AT THIS TIME 2* DECREASED ACTIVITY TOLERANCE, DECREASED ENDURANCE, DECREASED ADL STATUS, IMPULSIVENESS, AND DECREASED COG STATUS  FROM OT PERSPECTIVE, PT WILL BENEFIT FROM CONTINUED OT SERVICES IN IP REHAB SETTING  RECOMMEND D/C TO IP REHAB VS HOME PENDING MEDICAL STABILITY AND COGNITIVE STATUS  WILL FOLLOW 3-5X/WK IN ORDER TO MEET FOLLOWING GOALS BELOW        OT Discharge Recommendation: Short Term Rehab  OT - OK to Discharge: Yes (TO IP REHAB)

## 2017-10-12 NOTE — ASSESSMENT & PLAN NOTE
· With DKA present on admission  · Family reports patient non-compliant with insulin regimen  · Presently on insulin drip   Will consult endocrine for management  · Patient's mother states she is on Humalog 75/25 at home, 75U at breakfast and 80U with dinner

## 2017-10-12 NOTE — ASSESSMENT & PLAN NOTE
· Septic shock requiring pressors in the ICU  · Likely secondary to urosepsis   Urine culture and blood cultures both growing Klebsiella  · Patient on Rocephin and vancomycin  · ID following

## 2017-10-12 NOTE — ASSESSMENT & PLAN NOTE
· EF 35% on echo on outside records however echo here with EF 50-55% on 10/10/17  · Monitor fluid status closely   Appears euvolemic

## 2017-10-12 NOTE — PLAN OF CARE
DISCHARGE PLANNING - CARE MANAGEMENT     Discharge to post-acute care or home with appropriate resources Progressing        INFECTION - ADULT     Absence or prevention of progression during hospitalization Progressing     Absence of fever/infection during neutropenic period Progressing        Nutrition/Hydration-ADULT     Nutrient/Hydration intake appropriate for improving, restoring or maintaining nutritional needs Progressing        PAIN - ADULT     Verbalizes/displays adequate comfort level or baseline comfort level Progressing        Potential for Falls     Patient will remain free of falls Progressing        Prexisting or High Potential for Compromised Skin Integrity     Skin integrity is maintained or improved Progressing        SAFETY ADULT     Maintain or return to baseline ADL function Progressing     Maintain or return mobility status to optimal level Progressing

## 2017-10-12 NOTE — OCCUPATIONAL THERAPY NOTE
[address] Evaluation     Patient Name: Devon Dobbs  LBIYQ'[id number] Date: 10/12/2017  Problem List  Patient Active Problem List   Diagnosis    Altered mental status    Sepsis ([place] [address] )    Acute cystitis    Acute renal failure (ARF) ([place] [address] )    Cerebral meningioma ([place] [address] )    MARISA on CPAP    Chronic pain syndrome    DM2 (diabetes mellitus, type 2) ([place] [address] )    Continuous opioid dependence ([place] [address] )    KATIE (generalized anxiety disorder)    MDD (major depressive disorder)    Colon cancer ([place] [address] )    Liver cancer ([place] [address] )    HTN (hypertension)    HLD (hyperlipidemia)    Anemia    Chronic systolic congestive heart failure (HCC)    Status post colectomy    Pyuria    Elevated brain natriuretic peptide (BNP) level    Paranoid (HCC)    Rhabdomyolysis     Past Medical History  Past Medical History:   Diagnosis Date    Cancer ([place] [address] )     colon    CHF (congestive heart failure) ([place] [address] )     Diabetes mellitus ([place] [address] )     Hypertension     Psychiatric disorder      Past Surgical History  Past Surgical History:   Procedure Laterality Date    COLON SURGERY             10/12/17 1005   Note Type   Note type Eval/Treat   Restrictions/Precautions   Weight Bearing Precautions Per Order No   Other Precautions Cognitive; Fall Risk   Pain Assessment   Pain Assessment No/denies pain   Pain Score No Pain   Hospital Pain Intervention(s) Repositioned   Response to Interventions tolerated   Home Living   Type of [address] [facility] One level;Stairs to enter with rails   Bathroom Shower/Tub Tub/shower unit   Bathroom Toilet Standard   Bathroom Accessibility Accessible   Home Equipment Cane   Prior Function   Level of [place] Independent with ADLs and functional mobility   Lives With Alone   Receives Help From Friend(s)   ADL Assistance Independent   IADLs Independent   Falls in the last 6 months 1 to 4   Vocational On disability   Lifestyle   Autonomy PT PREVIOUSLY INDEPENDENT WITH ADLS/IADLS; USED CANE FOR FUNCTIONAL MOBILITY ADL;Decreased cognition;Decreased fine motor control   Prognosis Fair   Assessment FALL W/ LOC AND ALTERED MENTATION/CONFUSION, & TACHYCARDIA  PT DX WITH SEVERE SEPSIS, ACUTE RENAL FAILURE, ACUTE RHABDOMYOLYSIS & ACUTE TOXIC METABOLIC ENCEPHALOPATHY  PMH INCLUDES CHF, DM , CHRONIC PAIN SYNDROME, KATIE, MDD, OPOID DEPENDENCE, COLON CANCER, GERD, HTN, HLD, ANEMA, S/P COLECTOMY FOR ISCHEMIC BOWEL 7/17 AT [facility]  PT LIVES ALONE IN FIRST FLOOR APARTMENT  PT REPORTS PREVIOUS INDEPENDENCE IN ADLS AND RECEIVES HELP FROM FAMILY FOR IADLS  PT USED CANE AT BASELINE FOR FUNCTIONAL AND COMMUNITY MOBILITY  PT REPORTS 2-3 FALLS IN PAST 6 MO   PT CURRENTLY REQUIRES SUPERVISION FOR UB ADLS AND MIN A FOR LB ADLS 2* IMPULSIVITY, DECREASED SAFETY AWARENESS, AND DECREASED COGNITIVE STATUS  PT LIMITED AT THIS TIME 2* DECREASED ACTIVITY TOLERANCE, DECREASED ENDURANCE, DECREASED ADL STATUS, IMPULSIVENESS, AND DECREASED COG STATUS  FROM OT PERSPECTIVE, PT WILL BENEFIT FROM CONTINUED OT SERVICES IN IP REHAB SETTING  RECOMMEND D/C TO IP REHAB VS HOME PENDING MEDICAL STABILITY AND COGNITIVE STATUS  WILL FOLLOW 3-5X/WK IN ORDER TO MEET FOLLOWING GOALS BELOW  Goals   Patient Goals TO GO HOME    Additional Treatment Session   Start Time 0945   End Time 1010   Treatment Assessment PT SEEN FOR DAWSON COGNITIVE ASSESSMENT  SCORED  18/30 INDICATING MODERATECOGNITIVE IMPAIRMENT FOR AGE/EDUCATION  SCORES ARE AS FOLLOWS:   Recommendation   OT Discharge Recommendation Short Term Rehab   OT - OK to Discharge Yes  (TO IP REHAB)   Barthel Index   Feeding 5   Bathing 0   Grooming Score 0   Dressing Score 5   Bladder Score 10   Bowels Score 10   Toilet Use Score 5   Transfers (Bed/Chair) Score 10   Mobility (Level Surface) Score 0   Stairs Score 0   Barthel Index Score 45   Modified [place]ford Scale   Modified Waterford Scale 3     1  Pt will complete multistep grooming task w/ Mod I and no external cues, demonstrating G thoroughness in completing task     2  Pt  Will complete UB/LB ADLs w/ Mod I, demonstrating G safety awareness, insight, and thoroughness with no verbal cues  3  Pt  Will complete all transfers w/ Mod I and no vc to demonstrate increased safety and independence in functional mobility  4  Pt  Will attend to all ADL tasks w/ no cues or need for redirection to demonstrate increased attention  5  Pt  Will sequence multistep functional tasks with Mod I to demonstrate increased safety and independence with ADLs  6  Pt  Will recall safety precautions/concerns w/ Mod I to demonstrate increased insight and safety  7  Pt  Will participate ongoing cognitive assessments w/ G attention to assist w/ d/c recommendations  Documentation completed by FANG [place]

## 2017-10-12 NOTE — DISCHARGE SUMMARY
Consultation - Neuropsychology/Psychology Department  Tom Gonzales 47 y o  female MRN: [id number]  Unit/Bed#: -01 Encounter: [id number]        BACKGROUND:  Tom Gonzales is a 47y o  year old female who was referred for a Neuropsychological Exam to assess cognitive functioning and comment on capacity to make decisions  History of Present Illness  found down at home, confused; records indicate non-compliance with medications requiring frequent hospitalizations  Physician Requesting Consult: Marley Cuellar MD  Consults      Historical Information   Past Medical History:   Diagnosis Date    Cancer [facility])     colon    [facility]congestive [facility]) ([email or web address]     Diabetes mellitus ([email or web address] )     Hypertension     Psychiatric disorder      Past Surgical History:   Procedure Laterality Date    COLON SURGERY       Social History   History   Alcohol Use No     History   Drug Use No     History   Smoking Status    Never Smoker   Smokeless Tobacco    Never Used     Family History: History reviewed  No pertinent family history      Meds/Allergies   current meds:   Current Facility-Administered Medications   Medication Dose Route Frequency    acetaminophen (TYLENOL) tablet 650 mg  650 mg Oral Q6H PRN    acetaminophen (TYLENOL) tablet 975 mg  975 mg Oral Q8H [address]    aluminum-magnesium hydroxide-simethicone (MYLANTA) 200-200-20 mg/5 mL oral suspension 30 mL  30 mL Oral Q6H PRN    aspirin chewable tablet 81 mg  81 mg Oral Daily    cefTRIAXone (ROCEPHIN) 2,000 mg in dextrose 5 % 50 mL IVPB  2,000 mg Intravenous Q24H    cyclobenzaprine (FLEXERIL) tablet 10 mg  10 mg Oral TID PRN    diazepam (VALIUM) injection 10 mg  10 mg Intravenous Once    DULoxetine (CYMBALTA) delayed release capsule 60 mg  60 mg Oral Daily    famotidine (PEPCID) tablet 20 mg  20 mg Oral Daily    heparin (porcine) subcutaneous injection 7,500 Units  7,500 Units Subcutaneous Q8H [address]    insulin aspart protamine-insulin aspart (NovoLOG 70/30) 100 units/mL subcutaneous injection 40 Units  40 Units Subcutaneous Q6H    insulin lispro (HumaLOG) 100 units/mL subcutaneous injection 2-12 Units  2-12 Units Subcutaneous HS    insulin lispro (HumaLOG) 100 units/mL subcutaneous injection 4-20 Units  4-20 Units Subcutaneous TID AC    insulin regular (HumuLIN R,NovoLIN R) 1 Units/mL in sodium chloride 0 9 % 100 mL infusion  0 3-21 Units/hr Intravenous Titrated    ondansetron (ZOFRAN) injection 4 mg  4 mg Intravenous Q6H PRN    oxyCODONE (ROXICODONE) IR tablet 5 mg  5 mg Oral Q6H PRN    polyethylene glycol (MIRALAX) packet 17 g  17 g Oral Daily PRN    potassium chloride (K-DUR,KLOR-CON) CR tablet 40 mEq  40 mEq Oral Once    QUEtiapine (SEROquel) tablet 25 mg  25 mg Oral HS    senna (SENOKOT) tablet 8 6 mg  1 tablet Oral HS    sodium chloride (PF) 0 9 % injection 3 mL  3 mL Intravenous PRN       No Known Allergies    Imaging Studies:       Family and Social Support:   Support Systems: Friends/neighbors     Behavioral Observations: Alert, oriented x 3, cooperative; affect appeared appropriate to content; denied depressed mood and anxiety; no overt evidence of psychotic process; thoughts appeared logical and coherent; Patient reported several psychosocial stressors including son leaving for college, diagnosis of CHF, and having to move from her home  Patient was aware of medical history  Cognitive Examination    General Cognitive Functioning  MMSE = 25/28 with deficits in recall; General Fund of Information = Average    Attention/Concertration  Auditory Selective Attention =Low  Average; Auditory Vigilance = Average; Information Processing Speed = Average    Frontal Systems/Executive Functioning  Mental Flexibility/Cognitive Control = Average; Working Memory = Mildly Impaired;  Abstract Reasoning = Average; Novel Spatial Problem Solving = Average; Generative Ability = Average; Commonsense Reasoning and Judgement = Average    Language Functioning  Confrontation naming = Average; Phonemic Fluency = Average; Semantic Retrieval = Average; Comprehension of Complex Ideational Material = Average; Praxis = WNL's; Repetition = WNL'S; Basic Reading = WNL's; Following Commands = WNL's    Memory Functioning  Narrative Recall - Short Delay = Borderline; Long Delay Narrative Recall = Low Average;  Visual Recognition = Average    Visuo-Spatial Abilities Visuo-Construction (Blocks) = Average; Functional Knowledge  Health & Safety Knowledge = Within Normal Limits; Emotional Functioning: Denied current depression and anxiety  Summary/Impression: Results of Neuropsychological Exam revealed a deficit in working memory  All other areas of cognitive testing were within normal limits  On a measure assessing awareness of personal health status and ability to evaluate health problems, handle medical emergencies and take safety precautions, patient performed in the Average range  At this time, patient appears to have capacity to make fully informed  medical and self care decisions  Major Depressive Disorder without psychotic features

## 2017-10-12 NOTE — PLAN OF CARE
Problem: PHYSICAL THERAPY ADULT  Goal: Performs mobility at highest level of function for planned discharge setting  See evaluation for individualized goals  Treatment/Interventions: Functional transfer training, LE strengthening/ROM, Elevations, Therapeutic exercise, Endurance training, Bed mobility, Gait training          See flowsheet documentation for full assessment, interventions and recommendations  Prognosis: Fair  Problem List: Decreased strength, Decreased endurance, Impaired balance, Decreased mobility, Decreased cognition, Impaired judgement, Decreased safety awareness  Assessment: Pt is a 46 y/o female who was admitted s/p fall and altered mental status  Pt was admitted for BP management for septic shock  Pt has a history of CHF, cerebral meningioma, MARISA, type 2 DM, colon CA, HTN, HLD, depression, and anxiety  PTA, pt was (I) with ADLs and was ambulating with cane  During IE, pt was able to perform transfers with S and performed bed mobility with S  Pt was getting aggitated throughout session and at first refused to ambulate until later  Pt was found ambulating in her room holding onto things to get around  Pt was able to ambulate 20' with min A in the hallway  Pt was educated to call for RN when wanting to ambulate for safety  Pt would benefit from continued physical therapy sessions to improve balance, strength, and endurance  Recommend rehab upon d/c  Recommendation: Short-term skilled PT     PT - OK to Discharge:  (when medically stable to rehab)    See flowsheet documentation for full assessment

## 2017-10-12 NOTE — ASSESSMENT & PLAN NOTE
· Likely prerenal azotemia/ACEI/NSAID use, rhabdo  · Creatinine trended down with IVFs  · Patient now eating and drinking, fluids stopped

## 2017-10-12 NOTE — PROGRESS NOTES
Pt  Agree to restart insuline gtt  Then, pt  Removed both left and right Iv accesses  Nursing education provided   Pt  Stated that she will allow us to treat her new IV access in place  Will continue to monitor

## 2017-10-12 NOTE — PROGRESS NOTES
Progress Note - Infectious Disease   Srini Tubbs 47 y o  female MRN: [id number]  Unit/Bed#: -01 Encounter: [id number]          Impression/Recommendations:     47 y o  female with recent hospitalization in 2017 for ischemic bowel s/p ex lap with transverse colectomy admitted 10/9 after she was found down at home by EMS laying in her own feces/urine  Presented with hyperthermia up to 106 5, severe JORDAN, rhabdomyolysis       1  Septic shock with Klebsiella bacteremia- likely urosepsis  There were also GPRs reported on gram stain of the blood cultures  I spoke with micro lab and no GPRs actually grew in the blood cultures  Pt is clinically improving, out of ICU  Blood pressures, temps are stable  CT of chest/abdomen/pelvis negative for infection     -blood and urine cultures all positive for Klebsiella  C/w ceftriaxone 2g IV q24hrs  Plan to switch to oral abx on discharge   -No GPRs growing in blood cultures  Will stop vancomycin    -monitor temps, wbc count      2  Encephalopathy- likely toxic-metabolic secondary to #2  -mentation is improving with less confusion  -plan as above     3  JORDAN  -improving Cr; will continue to renally adjust abx dosages as needed     4  Uncontrolled diabetes  -recommend good blood glucose control in the setting of infection     Antibiotics:  Ceftriaxone D4    Subjective:  Patient now out of ICU  Less confused  Sitting in chair  No fevers/chills, vomiting, diarrhea, abd pain, urinary symptoms       Objective:  Vitals:  HR:  [102] 102  Resp:  [18] 18  BP: (129)/(62) 129/62  SpO2:  [94 %] 94 %  Temp (24hrs), Av 7 °F (37 1 °C), Min:98 7 °F (37 1 °C), Max:98 7 °F (37 1 °C)  Current: Temperature: 98 7 °F (37 1 °C)    Physical Exam:   General:  No acute distress; comfortable sitting in chair  Psychiatric:  Awake and alert  Pulmonary:  Normal respiratory excursion without accessory muscle use  Abdomen:  Soft, nontender, obese  Extremities:  No edema  Skin:  No rashes    Lab Results:  I have personally reviewed pertinent labs  Results from last 7 days  Lab Units 10/12/17  0534 10/11/17  0436 10/11/17  0057 10/10/17  2210  10/10/17  0431  10/09/17  1113   SODIUM mmol/L 136 136 137 136  < > 132*  < > 127*   POTASSIUM mmol/L 3 4* 4 1 3 4* 3 9  < > 3 8  < > 4 9   CHLORIDE mmol/L 102 103 102 103  < > 99*  < > 93*   CO2 mmol/L 22 24 23 21  < > 23  < > 19*   ANION GAP mmol/L 12 9 12 12  < > 10  < > 15*   BUN mg/dL 37* 50* 50* 54*  < > 69*  < > 72*   CREATININE mg/dL 1 52* 2 65* 2 92* 3 07*  < > 5 36*  < > 6 89*   EGFR ml/min/1 73sq m 39 20 18 17  < > 9  < > 6   GLUCOSE RANDOM mg/dL 166* 140 216* 185*  < > 285*  < > 491*   GLUCOSE, ISTAT   --   --   --   --   --   --   < >  --    CALCIUM mg/dL 8 3 7 8* 7 4* 7 7*  < > 8 0*  < > 8 8   AST U/L  --   --   --  59*  --  38  --  40   ALT U/L  --   --   --  23  --  19  --  21   ALK PHOS U/L  --   --   --  125*  --  91  --  108   TOTAL PROTEIN g/dL  --   --   --  6 3*  --  6 8  --  7 5   ALBUMIN g/dL  --   --   --  2 3*  --  2 7*  --  2 9*   BILIRUBIN TOTAL mg/dL  --   --   --  0 57  --  0 44  --  0 63   < > = values in this interval not displayed      Results from last 7 days  Lab Units 10/12/17  0534 10/11/17  0436 10/10/17  2210   WBC Thousand/uL 12 97* 14 74* 7 26   HEMOGLOBIN g/dL 8 9* 9 0* 8 9*   PLATELETS Thousands/uL 178 173 159       Results from last 7 days  Lab Units 10/12/17  1214 10/11/17  0541 10/09/17  1115 10/09/17  1113   BLOOD CULTURE  Klebsiella pneumoniae* No Growth at 24 hrs   --  Klebsiella pneumoniae*   GRAM STAIN RESULT  Gram positive rods  Gram negative rods  --   --  Gram positive rods  Gram negative rods   URINE CULTURE   --   --  >100,000 cfu/ml Klebsiella pneumoniae*  --

## 2017-10-12 NOTE — PROGRESS NOTES
Alert and oriented to self, no c/o pain or discomfort  Pt  With increase confusion  As per pt  Statement  "there is a little girl around here that is calling my names, telling me that I am fat and I should stop pleasuring myself " " can you please move my bed, there is water falling from the ceiling and I keep getting wet"  Pt  Reoriented to place and situation  Will continue to monitor

## 2017-10-12 NOTE — ASSESSMENT & PLAN NOTE
· with history of colon adenocarcinoma s/p ex lap with transverse colectomy 7/26/17 with Dr Teresa Patnio

## 2017-10-13 VITALS
HEIGHT: 64 IN | OXYGEN SATURATION: 93 % | RESPIRATION RATE: 18 BRPM | WEIGHT: 293 LBS | HEART RATE: 92 BPM | DIASTOLIC BLOOD PRESSURE: 67 MMHG | SYSTOLIC BLOOD PRESSURE: 147 MMHG | BODY MASS INDEX: 50.02 KG/M2 | TEMPERATURE: 98.2 F

## 2017-10-13 PROBLEM — N17.9 ACUTE RENAL FAILURE (ARF) (HCC): Status: RESOLVED | Noted: 2017-10-09 | Resolved: 2017-10-13

## 2017-10-13 PROBLEM — R79.89 ELEVATED BRAIN NATRIURETIC PEPTIDE (BNP) LEVEL: Status: RESOLVED | Noted: 2017-10-09 | Resolved: 2017-10-13

## 2017-10-13 PROBLEM — M62.82 RHABDOMYOLYSIS: Status: RESOLVED | Noted: 2017-10-12 | Resolved: 2017-10-13

## 2017-10-13 PROBLEM — F22 PARANOID (HCC): Status: RESOLVED | Noted: 2017-10-12 | Resolved: 2017-10-13

## 2017-10-13 PROBLEM — R82.81 PYURIA: Status: RESOLVED | Noted: 2017-10-09 | Resolved: 2017-10-13

## 2017-10-13 PROBLEM — N39.0 UTI (URINARY TRACT INFECTION): Status: ACTIVE | Noted: 2017-10-13

## 2017-10-13 PROBLEM — A41.9 SEPSIS (HCC): Status: RESOLVED | Noted: 2017-10-09 | Resolved: 2017-10-13

## 2017-10-13 PROBLEM — R41.82 ALTERED MENTAL STATUS: Status: RESOLVED | Noted: 2017-10-09 | Resolved: 2017-10-13

## 2017-10-13 LAB
ANION GAP SERPL CALCULATED.3IONS-SCNC: 10 MMOL/L (ref 4–13)
BASOPHILS # BLD AUTO: 0.03 THOUSANDS/ΜL (ref 0–0.1)
BASOPHILS NFR BLD AUTO: 0 % (ref 0–1)
BUN SERPL-MCNC: 29 MG/DL (ref 5–25)
CALCIUM SERPL-MCNC: 8 MG/DL (ref 8.3–10.1)
CHLORIDE SERPL-SCNC: 107 MMOL/L (ref 100–108)
CO2 SERPL-SCNC: 24 MMOL/L (ref 21–32)
CREAT SERPL-MCNC: 1.17 MG/DL (ref 0.6–1.3)
EOSINOPHIL # BLD AUTO: 0.14 THOUSAND/ΜL (ref 0–0.61)
EOSINOPHIL NFR BLD AUTO: 1 % (ref 0–6)
ERYTHROCYTE [DISTWIDTH] IN BLOOD BY AUTOMATED COUNT: 15.8 % (ref 11.6–15.1)
GFR SERPL CREATININE-BSD FRML MDRD: 54 ML/MIN/1.73SQ M
GLUCOSE SERPL-MCNC: 114 MG/DL (ref 65–140)
GLUCOSE SERPL-MCNC: 145 MG/DL (ref 65–140)
GLUCOSE SERPL-MCNC: 91 MG/DL (ref 65–140)
GLUCOSE SERPL-MCNC: 94 MG/DL (ref 65–140)
HCT VFR BLD AUTO: 27.8 % (ref 34.8–46.1)
HGB BLD-MCNC: 9 G/DL (ref 11.5–15.4)
LYMPHOCYTES # BLD AUTO: 0.96 THOUSANDS/ΜL (ref 0.6–4.47)
LYMPHOCYTES NFR BLD AUTO: 8 % (ref 14–44)
MCH RBC QN AUTO: 25.1 PG (ref 26.8–34.3)
MCHC RBC AUTO-ENTMCNC: 32.4 G/DL (ref 31.4–37.4)
MCV RBC AUTO: 78 FL (ref 82–98)
MONOCYTES # BLD AUTO: 1.75 THOUSAND/ΜL (ref 0.17–1.22)
MONOCYTES NFR BLD AUTO: 15 % (ref 4–12)
NEUTROPHILS # BLD AUTO: 8.56 THOUSANDS/ΜL (ref 1.85–7.62)
NEUTS SEG NFR BLD AUTO: 76 % (ref 43–75)
NRBC BLD AUTO-RTO: 0 /100 WBCS
PLATELET # BLD AUTO: 212 THOUSANDS/UL (ref 149–390)
PMV BLD AUTO: 11.3 FL (ref 8.9–12.7)
POTASSIUM SERPL-SCNC: 3.2 MMOL/L (ref 3.5–5.3)
RBC # BLD AUTO: 3.58 MILLION/UL (ref 3.81–5.12)
SODIUM SERPL-SCNC: 141 MMOL/L (ref 136–145)
WBC # BLD AUTO: 11.53 THOUSAND/UL (ref 4.31–10.16)

## 2017-10-13 PROCEDURE — 97110 THERAPEUTIC EXERCISES: CPT

## 2017-10-13 PROCEDURE — 82948 REAGENT STRIP/BLOOD GLUCOSE: CPT

## 2017-10-13 PROCEDURE — 97532 HB COGNITIVE SKILLS DEVELOPMENT: CPT

## 2017-10-13 PROCEDURE — 80048 BASIC METABOLIC PNL TOTAL CA: CPT | Performed by: PHYSICIAN ASSISTANT

## 2017-10-13 PROCEDURE — 97116 GAIT TRAINING THERAPY: CPT

## 2017-10-13 PROCEDURE — 85025 COMPLETE CBC W/AUTO DIFF WBC: CPT | Performed by: PHYSICIAN ASSISTANT

## 2017-10-13 RX ORDER — MUSCLE RUB CREAM 100; 150 MG/G; MG/G
CREAM TOPICAL 4 TIMES DAILY PRN
Status: DISCONTINUED | OUTPATIENT
Start: 2017-10-13 | End: 2017-10-13 | Stop reason: HOSPADM

## 2017-10-13 RX ORDER — INSULIN ASPART 100 [IU]/ML
40 INJECTION, SUSPENSION SUBCUTANEOUS EVERY 6 HOURS
Qty: 4800 UNITS | Refills: 0 | Status: SHIPPED | OUTPATIENT
Start: 2017-10-13 | End: 2017-11-12

## 2017-10-13 RX ORDER — POTASSIUM CHLORIDE 20 MEQ/1
40 TABLET, EXTENDED RELEASE ORAL ONCE
Status: COMPLETED | OUTPATIENT
Start: 2017-10-13 | End: 2017-10-13

## 2017-10-13 RX ORDER — CIPROFLOXACIN 500 MG/1
500 TABLET, FILM COATED ORAL EVERY 12 HOURS SCHEDULED
Qty: 20 TABLET | Refills: 0 | Status: SHIPPED | OUTPATIENT
Start: 2017-10-13 | End: 2017-10-23

## 2017-10-13 RX ADMIN — ACETAMINOPHEN 650 MG: 325 TABLET, FILM COATED ORAL at 01:44

## 2017-10-13 RX ADMIN — HEPARIN SODIUM 7500 UNITS: 5000 INJECTION, SOLUTION INTRAVENOUS; SUBCUTANEOUS at 05:47

## 2017-10-13 RX ADMIN — POTASSIUM CHLORIDE 40 MEQ: 1500 TABLET, EXTENDED RELEASE ORAL at 09:09

## 2017-10-13 RX ADMIN — INSULIN ASPART 40 UNITS: 100 INJECTION, SUSPENSION SUBCUTANEOUS at 07:43

## 2017-10-13 RX ADMIN — HEPARIN SODIUM 7500 UNITS: 5000 INJECTION, SOLUTION INTRAVENOUS; SUBCUTANEOUS at 13:28

## 2017-10-13 RX ADMIN — OXYCODONE HYDROCHLORIDE 5 MG: 5 TABLET ORAL at 14:00

## 2017-10-13 RX ADMIN — ACETAMINOPHEN 975 MG: 325 TABLET, FILM COATED ORAL at 13:28

## 2017-10-13 RX ADMIN — CYCLOBENZAPRINE HYDROCHLORIDE 10 MG: 10 TABLET, FILM COATED ORAL at 01:44

## 2017-10-13 RX ADMIN — OXYCODONE HYDROCHLORIDE 5 MG: 5 TABLET ORAL at 07:50

## 2017-10-13 RX ADMIN — CYCLOBENZAPRINE HYDROCHLORIDE 10 MG: 10 TABLET, FILM COATED ORAL at 12:09

## 2017-10-13 RX ADMIN — INSULIN ASPART 40 UNITS: 100 INJECTION, SUSPENSION SUBCUTANEOUS at 01:42

## 2017-10-13 RX ADMIN — FAMOTIDINE 20 MG: 20 TABLET, FILM COATED ORAL at 09:09

## 2017-10-13 RX ADMIN — INSULIN ASPART 40 UNITS: 100 INJECTION, SUSPENSION SUBCUTANEOUS at 13:28

## 2017-10-13 RX ADMIN — ASPIRIN 81 MG 81 MG: 81 TABLET ORAL at 09:09

## 2017-10-13 RX ADMIN — DULOXETINE 60 MG: 60 CAPSULE, DELAYED RELEASE ORAL at 09:09

## 2017-10-13 RX ADMIN — MENTHOL, METHYL SALICYLATE: 10; 15 CREAM TOPICAL at 10:46

## 2017-10-13 NOTE — PHYSICAL THERAPY NOTE
Physical Therapy Treatment Note     10/13/17 1445   Pain Assessment   Pain Assessment No/denies pain   Pain Score No Pain   Restrictions/Precautions   Weight Bearing Precautions Per Order No   Other Precautions Cognitive; Fall Risk   General   Chart Reviewed Yes   Cognition   Overall Cognitive Status Impaired   Arousal/Participation Responsive   Attention Attends with cues to redirect   Following Commands Follows one step commands with increased time or repetition   Subjective   Subjective Pt reported that she wanted to walk into the hallway and that she wants to go home  Bed Mobility   Sit to Supine 5  Supervision   Additional items Impulsive   Additional Comments Pt was sitting up in chair upon arrival   Transfers   Sit to Stand 5  Supervision   Additional items Impulsive   Stand to Sit 5  Supervision   Additional items Impulsive   Ambulation/Elevation   Gait pattern Short stride;Decreased foot clearance   Gait Assistance 5  Supervision   Additional items Verbal cues   Assistive Device Rolling walker   Distance 60'   Balance   Static Sitting Good   Dynamic Sitting Fair +   Static Standing Fair   Dynamic Standing Fair   Ambulatory Fair -   Endurance Deficit   Endurance Deficit Yes   Activity Tolerance   Activity Tolerance Patient limited by fatigue   Exercises   Knee AROM Long Arc Quad Sitting;10 reps;AROM; Bilateral   Ankle Pumps Sitting;10 reps;AROM; Bilateral   Marching Sitting;10 reps;AROM; Bilateral   Assessment   Prognosis Fair   Problem List Decreased strength;Decreased endurance; Impaired balance;Decreased mobility; Decreased cognition; Impaired judgement;Decreased safety awareness   Assessment Pt seen for PT treatment session  Pt was more alert and less confused than yesterday  Pt was able to perform transfers with S and performed bed mobility with S  Pt was able to ambulate 61' with RW and S  Pt more steady using RW and advised to use for now  Pt performed therapeutic exercises with no complaints   Pt would benefit from continued inpatient physical therapy to improve balance, strength, and endurance  Recommend rehab upon d/c  Goals   STG Expiration Date 10/22/17   Treatment Day 1   Plan   Treatment/Interventions Functional transfer training;LE strengthening/ROM; Elevations; Therapeutic exercise; Endurance training;Bed mobility;Gait training   Progress Progressing toward goals   PT Frequency 5x/wk   Recommendation   Recommendation Short-term skilled PT   PT - OK to Discharge (when medically stable to rehab)   Andrea Charlton, SPT

## 2017-10-13 NOTE — PLAN OF CARE
Problem: PHYSICAL THERAPY ADULT  Goal: Performs mobility at highest level of function for planned discharge setting  See evaluation for individualized goals  Treatment/Interventions: Functional transfer training, LE strengthening/ROM, Elevations, Therapeutic exercise, Endurance training, Bed mobility, Gait training          See flowsheet documentation for full assessment, interventions and recommendations  Outcome: Progressing  Prognosis: Fair  Problem List: Decreased strength, Decreased endurance, Impaired balance, Decreased mobility, Decreased cognition, Impaired judgement, Decreased safety awareness  Assessment: Pt seen for PT treatment session  Pt was more alert and less confused than yesterday  Pt was able to perform transfers with S and performed bed mobility with S  Pt was able to ambulate 61' with RW and S  Pt more steady using RW and advised to use for now  Pt performed therapeutic exercises with no complaints  Pt would benefit from continued inpatient physical therapy to improve balance, strength, and endurance  Recommend rehab upon d/c  Recommendation: Short-term skilled PT     PT - OK to Discharge:  (when medically stable to rehab)    See flowsheet documentation for full assessment

## 2017-10-13 NOTE — DISCHARGE SUMMARY
Discharge Summary - [facility] Internal Medicine    Patient Information: Barron Freed 47 y o  female MRN: [id number]  Unit/Bed#: -01 Encounter: [id number]    Discharging Physician / Practitioner: Matti Francis PA-C  PCP: Lan Klein MD  Admission Date: 10/9/2017  Discharge Date: 10/13/17    Reason for Admission: Sepsis    Discharge Diagnoses:     Principal Problem:    UTI (urinary tract infection)  Active Problems:    DM2 (diabetes mellitus, type 2) ([facility] )    Acute cystitis    Cerebral meningioma ([facility] )    MARISA on CPAP    Chronic pain syndrome    Continuous opioid dependence ([place] [address] )    KATIE (generalized anxiety disorder)    MDD (major depressive disorder)    Colon cancer ([facility] )    Liver cancer ([facility] )    HTN (hypertension)    HLD (hyperlipidemia)    Anemia    Chronic systolic congestive heart failure ([facility] )    Status post colectomy  Resolved Problems:    Sepsis ([facility] )    Acute renal failure (ARF) ([facility] )    Paranoid ([facility] )    Altered mental status    CAD (coronary artery disease)    Pyuria    Elevated brain natriuretic peptide (BNP) level    Rhabdomyolysis      Consultations During Hospital Stay:  · Infectious Disease  · Nephrology  · Endocrinology  · Nephrology  · Psychiatry  · Neuropsychiatry    Procedures Performed:     · CT head: No acute intracranial abnormality  Left frontal calcified meningioma not significantly changed in size when compared to an MRI brain dated 3/22/15  · CT c-spine: No cervical spine fracture or traumatic malalignment  · CT c/a/p: No acute intracranial abnormality   See incidental findings listed below  · CT head: Stable exam  No acute intracranial abnormality  · CXR: Mild pulmonary congestion  · CXR: No acute findings    Significant Findings / Test Results:     · DKA  · JORDAN  · Rhabdomyolysis  · Septic shock  · Klebsiella urosepsis  · Klebsiella bacteremia    Incidental Findings:   · 10mm right lower lobe groundglass nodular opacity (recommend f/u CT chest in 3 months)  · 15mm left adrenal adenoma    Test Results Pending at Discharge (will require follow up): · None     Outpatient Tests Requested:  · BMP one week    Complications:  None    Hospital Course:     Tom Gonzales is a 47 y o  female patient with history of diabetes, CHF, colon cancer, ischemic bowel status post resection, and MARISA on CPAP who originally presented to the hospital on 10/9/2017 due to altered mental status  She was found down in her home surrounded by urine and feces  She was down for an unknown length of time  On admission, she was found to have significant acute kidney injury with a creatinine of 6 8 and had an elevated CK at over 1000  She was also found to be in DKA  She met sepsis criteria on admission which was thought to be secondary to urosepsis given abnormal urinalysis  She was originally admitted to Medicine service as a step-down patient but became acutely encephalopathic and hypotensive  She was admitted to the ICU and required pressors  She was treated with broad-spectrum IV antibiotics and aggressive IV fluid resuscitation  Her creatinine trended down with IV fluids  Her urine culture and blood culture both grew Klebsiella  She will be discharged with prescription for oral ciprofloxacin 500 mg every 12 hours to complete a 14 day course  This well and on 10/22/2017  She was seen by Endocrinology in regards to her diabetes  She will be discharged on NovoLog 70/30, 40 units every 6 hours  There were concerns by her family that the patient was unsafe to live at home and she was also having episodes of paranoia  She was seen by both psychiatry and neuropsychiatry and deemed competent to make her own medical decisions  Rehab was recommended, but patient refused rehab and prefers to have home therapy which will be set up for her  She tells me she has a follow-up appointment with her PCP on 10/08/2017  Her ACEI has been discontinued indefinitely due to JORDAN   She should have repeat BMP in one week     Condition at Discharge: stable     Discharge Day Visit / Exam:     Subjective:  Patient feeling well today  Has been up and moving  No complaints today  Vitals: Blood Pressure: 147/67 (10/12/17 2346)  Pulse: 92 (10/12/17 2346)  Temperature: 98 2 °F (36 8 °C) (10/12/17 2346)  Temp Source: Oral (10/12/17 2346)  Respirations: 18 (10/12/17 2346)  Height: 5' 4" (162 6 cm) (10/10/17 1250)  Weight - Scale: 136 kg (300 lb) (10/13/17 0541)  SpO2: 93 % (10/12/17 2346)  Exam:   Physical Exam   Constitutional: She is oriented to person, place, and time  She appears well-developed and well-nourished  No distress  Obese  NAD  HENT:   Head: Normocephalic and atraumatic  Eyes: No scleral icterus  Neck: Normal range of motion  Neck supple  Cardiovascular: Normal rate, regular rhythm and normal heart sounds  Pulmonary/Chest: Effort normal and breath sounds normal  No respiratory distress  She has no wheezes  Abdominal: Soft  Bowel sounds are normal  She exhibits no distension  There is no tenderness  There is no rebound  Musculoskeletal: She exhibits no edema  Neurological: She is alert and oriented to person, place, and time  Skin: Skin is warm and dry  She is not diaphoretic  Psychiatric: She has a normal mood and affect  Discussion with Family: Discussed with patient's mother, STEVENgabyclaynatan STEVEN  Discharge instructions/Information to patient and family:   See after visit summary for information provided to patient and family  Provisions for Follow-Up Care:  See after visit summary for information related to follow-up care and any pertinent home health orders  Disposition:     Home with [facility] Services (Reminder: Complete face to face encounter)    For Discharges to [facility] SNF:   · Not Applicable to this Patient - Not Applicable to this Patient    Planned Readmission: None     Discharge Statement:  I spent 45 minutes discharging the patient   This time was spent on the day of discharge  I had direct contact with the patient on the day of discharge  Greater than 50% of the total time was spent examining patient, answering all patient questions, arranging and discussing plan of care with patient as well as directly providing post-discharge instructions  Additional time then spent on discharge activities  Discharge Medications:  See after visit summary for reconciled discharge medications provided to patient and family        ** Please Note: This note has been constructed using a voice recognition system **

## 2017-10-13 NOTE — SOCIAL WORK
Pt medically cleared for d/c  CM met with patient and discussed safe discharge  Pt states she does not want to go to a rehab, and she feels safe at home  Pt states she has appropriate financial support via disability, she cooks her meals while sitting in a chair, and frequently use a microwave  Pt reports her neighbors and friends check on her frequently along with her mother and son  Pt reports she see's a therapist outpt every three months, and can call if she is having a problem  Pt reported knowledge and importance of taking medications and following up with appointments  CM inform patient I put in referral for home nursing, PT/OT per physicians request  Pt agreeable to [facility]  CM verified pts address and phone number

## 2017-10-13 NOTE — PROGRESS NOTES
Progress Note - Infectious Disease   Ethyl Bombard 47 y o  female MRN: [id number]  Unit/Bed#: -01 Encounter: [id number]          Impression/Recommendations:     47 y o  female with recent hospitalization in 2017 for ischemic bowel s/p ex lap with transverse colectomy admitted 10/9 after she was found down at home by EMS laying in her own feces/urine  Presented with hyperthermia up to 106 5, severe JORDAN, rhabdomyolysis       1  Septic shock with Klebsiella bacteremia- likely urosepsis  There were also GPRs reported on gram stain of the blood cultures, but no GPRs actually grew in the blood cultures  Pt is clinically improving, out of ICU  Blood pressures, temps are stable  CT of chest/abdomen/pelvis negative for infection     -switch to oral ciprofloxacin 500mg PO q12hrs to complete a 14 day course- planned end date of 10/22/17  Informed patient of potential side effects with cipro  If she develops severe watery bowel movements, she should call her PCP or come back to hospital      2  Encephalopathy- likely toxic-metabolic secondary to #2  -mentation is improving with less confusion  -plan as above     3  JORDAN  -improved Cr; will continue to renally adjust abx dosages as needed     4  Uncontrolled diabetes  -recommend good blood glucose control in the setting of infection     Antibiotics:  Ceftriaxone D5    Subjective:  Pt feels much better and wants to go home  No urinary symptoms, shortness of breath, cough, fevers/chills  Had a BM that was looser today but not watery  No abd pain      Objective:  Vitals:  HR:  [] 92  Resp:  [18-20] 18  BP: (119-147)/(60-72) 147/67  SpO2:  [93 %-98 %] 93 %  Temp (24hrs), Av 3 °F (36 8 °C), Min:98 1 °F (36 7 °C), Max:98 5 °F (36 9 °C)  Current: Temperature: 98 2 °F (36 8 °C)    Physical Exam:   General:  No acute distress  Psychiatric:  Awake and alert  Pulmonary:  Normal respiratory excursion without accessory muscle use  Abdomen:  Soft, nontender  Extremities: No edema  Skin:  No rashes    Lab Results:  I have personally reviewed pertinent labs  Results from last 7 days  Lab Units 10/13/17  0532 10/12/17  0534 10/11/17  0436  10/10/17  2210  10/10/17  0431  10/09/17  1113   SODIUM mmol/L 141 136 136  < > 136  < > 132*  < > 127*   POTASSIUM mmol/L 3 2* 3 4* 4 1  < > 3 9  < > 3 8  < > 4 9   CHLORIDE mmol/L 107 102 103  < > 103  < > 99*  < > 93*   CO2 mmol/L 24 22 24  < > 21  < > 23  < > 19*   ANION GAP mmol/L 10 12 9  < > 12  < > 10  < > 15*   BUN mg/dL 29* 37* 50*  < > 54*  < > 69*  < > 72*   CREATININE mg/dL 1 17 1 52* 2 65*  < > 3 07*  < > 5 36*  < > 6 89*   EGFR ml/min/1 73sq m 54 39 20  < > 17  < > 9  < > 6   GLUCOSE RANDOM mg/dL 91 166* 140  < > 185*  < > 285*  < > 491*   GLUCOSE, ISTAT   --   --   --   --   --   --   --   < >  --    CALCIUM mg/dL 8 0* 8 3 7 8*  < > 7 7*  < > 8 0*  < > 8 8   AST U/L  --   --   --   --  59*  --  38  --  40   ALT U/L  --   --   --   --  23  --  19  --  21   ALK PHOS U/L  --   --   --   --  125*  --  91  --  108   TOTAL PROTEIN g/dL  --   --   --   --  6 3*  --  6 8  --  7 5   ALBUMIN g/dL  --   --   --   --  2 3*  --  2 7*  --  2 9*   BILIRUBIN TOTAL mg/dL  --   --   --   --  0 57  --  0 44  --  0 63   < > = values in this interval not displayed      Results from last 7 days  Lab Units 10/13/17  0532 10/12/17  0534 10/11/17  0436   WBC Thousand/uL 11 53* 12 97* 14 74*   HEMOGLOBIN g/dL 9 0* 8 9* 9 0*   PLATELETS Thousands/uL 212 178 173       Results from last 7 days  Lab Units 10/12/17  1214 10/11/17  0541 10/09/17  1115 10/09/17  1113   BLOOD CULTURE  Klebsiella pneumoniae* No Growth at 24 hrs   --  Klebsiella pneumoniae*   GRAM STAIN RESULT  Gram positive rods  Gram negative rods  --   --  Gram positive rods  Gram negative rods   URINE CULTURE   --   --  >100,000 cfu/ml Klebsiella pneumoniae*  --

## 2017-10-13 NOTE — OCCUPATIONAL THERAPY NOTE
Occupational Therapy Treatment Note     10/13/17 4497   Restrictions/Precautions   Weight Bearing Precautions Per Order No   Other Precautions Cognitive   Lifestyle   Autonomy PT PREVIOUSLY INDEPENDENT WITH ADLS/IADLS; USED CANE FOR FUNCTIONAL MOBILITY    Reciprocal Relationships LIVES ALONE; HELP FROM FRIENDS   Service to Others ON DISABILITY    Intrinsic Gratification ENJOYS WATCHING TV   Cognition   Overall Cognitive Status Impaired   Arousal/Participation Responsive   Attention Attends with cues to redirect   Orientation Level Oriented to place;Oriented to person;Disoriented to situation;Disoriented to time   Memory Decreased short term memory   Following Commands Follows one step commands with increased time or repetition   Assessment   Assessment Pt participated in occupational therapy with focus on activity tolerance, and formal cognitive screening  Pt pleasant and cooperative with OT session  Pt completed formal cognitive screen Cody Cognitive Level Screen ACLS and based on pt score 4 4 pt may live with someone who does a daily check on the environment to remover safety hazards or solve problems  Pt may be alone for periods of the day with a pre-established plan for seeking assistance by pohone or from a neighbor  Recommendation   OT Discharge Recommendation Home with daily checks   Barthel Index   Grooming Score 0   Dressing Score 5   Toilet Use Score 5   Transfers (Bed/Chair) Score 10   Mobility (Level Surface) Score 0     4 4    Administered Cody Cognitive Level Screen (ACLS)  Pt scored 4 4/6 0 indicating it is recommended that the person live with someone who does a daily check on the environment to remove any safety hazards and solve problems when minor changes in the home occur  The person may be alone for part of the day with a pre-established procedure for getting help from a neighbor  Behavior:  Knows day/date  Follows routine sequence of activities    Will learn schedule and follow daily routine  Hazards are not anticipated  Memorization is slow  Will need long term repetitive training for all new tasks  May become upset if routine is altered  May seek assist if lost   Do not expect to be aware of needs of others  May need reminders to keep appointments  Grooming:  Palmer, brushes and styles hair  Applies makeup  May insist on familiar products  Finds supplies in familiar locations  May be unable to master use of new tools  Hazards are not anticipated  Dressing: Finds garments in familiar locations  Initiates dressing at usual times  Selects familiar combinations of outfits and may wear same outfit over and over  Resists new combinations  May fail to consider weather conditions, season or occasion when selecting clothing  May argue with suggested corrections of errors  Bathing:  Initiates bath/shower at customary times and follows typical routine  May collect supplies from a familiar location  May not vary rate  May want to use same products  May use excessive amounts of product  May have difficulty opening small or unusual containers  May leave towels on floor  Protect from unseen hazards (wet floors, electrical appliances near water)  Walking/exercising:  Ambulates within fitness capacity in neighborhood  Chooses to go by familiar routes  May fail to attend to signs or activities outside visual field  Needs new route identified by others and may learn after several weeks of practice  May become anxious in high stimulus environments (malls, airports, casinos)  Eating: May notice and clean highly visible dropped food items  May not be able to eat and converse at the same time  May resist changes in diet and menu  Watch handling of hot foods/liquids and heavy items  Toileting: Follows a routine of toileting in a familiar environment  Needs to have public rest rooms pointed out  May use too much paper  Does not consider needs of others    May spend excessive time in rest room  Medications: May follow routine rigidly and resist changes in prescriptions based on erroneous beliefs of effects  Does not note adverse side effects  Does not anticipate need to renew prescriptions  May be able to open child proof containers  Can use DAILY pill box marked with day/time (filled by another person)  Use of adaptive equipment:  May be trained to use adaptive equipment that requires a sequence of familiar actions  Does not anticipate hazards  Once learned, may resist changes in equipment  May abandon use of assistive device or use in unsafe manner  Housekeeping:  Sweeps, polishes and puts away objects to match previous performance  Fails to see dirt or dust in corners  May use excessive amounts of , polish or water  May resist changes in routine or products used  May fail to differentiate between similar cleaning products  Food preparation:  May follow a fixed diet and go hungry if usual food items are not available  Does not check inventory and may run out of essentials frequently  Does not consider nutritional needs  Goes to familiar restaurants of grocery stores  Is able to prepare simple hot/cold dishes  Spending money:  Manages routine purchases for immediate needs  May count cash very slowly  May use credit cards or checks  May need assistance for making monthly budget  May not remember to account for taxes or tips  Can manage a daily allowance  Shopping:  Goes to familiar stores for routine items  Does not compare product prices or quality  May not consider cost of item in relation to overall budget  May overspend  Laundry:  Initiates and completes laundry routine at usual time  May sort items by color  May follow schedule rigidly  May forget to clean lint traps  May forget to turn iron off  Traveling:  Needs new routes and travel procedures identified by others    May express interest in driving a car but fails to attend to all environmental cues to do so safely  May not allow adequate time for travel  Telephone:  Writes down messages and new numbers slowly  May attempt to use an address book  May make calls at odd hours without consideration of others schedule or cost of call  May run up large telephone bills  Driving: Should NOT operate a motor vehicle          Magaly CROW

## 2017-10-13 NOTE — INCIDENTAL FINDINGS
The following findings require follow up:  Radiographic finding   Finding: 10mm right lower lobe groundglass nodular opacity  15mm left adrenal adenoma  Follow up required: Yes   Follow up should be done within 3 month(s)    Please notify the following clinician to assist with the follow up: Your family doctor

## 2017-10-13 NOTE — DISCHARGE INSTRUCTIONS
Please stop taking lisinopril  Also, please do not take NSAIDs, such as ibuprofen/Advil, meloxicam/Mobic  These medications can damage your kidneys  Take Tylenol for pain instead  Follow up with your family doctor in one week  We recommend routine blood work in one week  There was also incidental abnormal findings on the CT scan of your chest that need to be followed up with your family doctor  Your insulin was changed to Humalog 70/30 40units every 6 hours  We recommend repeat blood work (BMP) in one week to check your kidney function  Your family doctor can arrange this

## 2017-10-13 NOTE — PROGRESS NOTES
10/13/17 1500   Clinical Encounter Type   Visited With Patient   Moravian Encounters   Moravian Needs Prayer   Patient Spiritual Encounters   Spiritual Assessment 5   Spiritual Encounter Notes (Encouraged PT to focus on present )     [place]

## 2017-10-13 NOTE — PLAN OF CARE
Problem: OCCUPATIONAL THERAPY ADULT  Goal: Performs self-care activities at highest level of function for planned discharge setting  See evaluation for individualized goals  See flowsheet documentation for full assessment, interventions and recommendations  Outcome: Progressing  Limitation: Decreased ADL status, Decreased Safe judgement during ADL, Decreased cognition, Decreased fine motor control  Prognosis: Fair  Assessment: Pt participated in occupational therapy with focus on activity tolerance, and formal cognitive screening  Pt pleasant and cooperative with OT session  Pt completed formal cognitive screen Cody Cognitive Level Screen ACLS and based on pt score 4 4 pt may live with someone who does a daily check on the environment to remover safety hazards or solve problems  Pt may be alone for periods of the day with a pre-established plan for seeking assistance by pohone or from a neighbor        OT Discharge Recommendation: Home with daily checks  OT - OK to Discharge: Yes (TO IP REHAB)

## 2017-10-16 LAB — BACTERIA BLD CULT: NORMAL

## 2017-11-01 ENCOUNTER — LAB REQUISITION (OUTPATIENT)
Dept: LAB | Facility: HOSPITAL | Age: 52
End: 2017-11-01
Payer: COMMERCIAL

## 2017-11-01 DIAGNOSIS — I50.22 CHRONIC SYSTOLIC CONGESTIVE HEART FAILURE (HCC): ICD-10-CM

## 2017-11-01 DIAGNOSIS — Z79.4 LONG TERM CURRENT USE OF INSULIN (HCC): ICD-10-CM

## 2017-11-01 DIAGNOSIS — E11.42 TYPE 2 DIABETES MELLITUS WITH DIABETIC POLYNEUROPATHY (HCC): ICD-10-CM

## 2017-11-01 LAB
ANION GAP SERPL CALCULATED.3IONS-SCNC: 9 MMOL/L (ref 4–13)
BUN SERPL-MCNC: 31 MG/DL (ref 5–25)
CALCIUM SERPL-MCNC: 9 MG/DL (ref 8.3–10.1)
CHLORIDE SERPL-SCNC: 97 MMOL/L (ref 100–108)
CO2 SERPL-SCNC: 29 MMOL/L (ref 21–32)
CREAT SERPL-MCNC: 1.07 MG/DL (ref 0.6–1.3)
EST. AVERAGE GLUCOSE BLD GHB EST-MCNC: 232 MG/DL
GFR SERPL CREATININE-BSD FRML MDRD: 60 ML/MIN/1.73SQ M
GLUCOSE P FAST SERPL-MCNC: 203 MG/DL (ref 65–99)
HBA1C MFR BLD: 9.7 % (ref 4.2–6.3)
POTASSIUM SERPL-SCNC: 4.1 MMOL/L (ref 3.5–5.3)
SODIUM SERPL-SCNC: 135 MMOL/L (ref 136–145)

## 2017-11-01 PROCEDURE — 83036 HEMOGLOBIN GLYCOSYLATED A1C: CPT | Performed by: INTERNAL MEDICINE

## 2017-11-01 PROCEDURE — 80048 BASIC METABOLIC PNL TOTAL CA: CPT | Performed by: INTERNAL MEDICINE

## 2017-11-08 ENCOUNTER — LAB REQUISITION (OUTPATIENT)
Dept: LAB | Facility: HOSPITAL | Age: 52
End: 2017-11-08
Payer: COMMERCIAL

## 2017-11-08 DIAGNOSIS — E11.42 TYPE 2 DIABETES MELLITUS WITH DIABETIC POLYNEUROPATHY (HCC): ICD-10-CM

## 2017-11-08 DIAGNOSIS — I50.22 CHRONIC SYSTOLIC CONGESTIVE HEART FAILURE (HCC): ICD-10-CM

## 2017-11-08 DIAGNOSIS — Z79.4 LONG TERM CURRENT USE OF INSULIN (HCC): ICD-10-CM

## 2017-11-08 LAB
CHOLEST SERPL-MCNC: 85 MG/DL (ref 50–200)
HDLC SERPL-MCNC: 31 MG/DL (ref 40–60)
LDLC SERPL CALC-MCNC: 33 MG/DL (ref 0–100)
TRIGL SERPL-MCNC: 105 MG/DL

## 2017-11-08 PROCEDURE — 80061 LIPID PANEL: CPT | Performed by: INTERNAL MEDICINE

## 2018-01-16 NOTE — PROCEDURES
Procedures by Jody Hernandez MD at 10/9/2017  9:46  PM      Author:  Jody Hernandez MD Service:  Critical Care/ICU Author Type:  Resident    Filed:  10/9/2017 11:56 PM Date of Service:  10/9/2017  9:46 PM Status:  Attested    :  Jody Hernandez MD (Resident)  Cosigner:  Kei Hancock MD at 10/10/2017  1:13 AM      Procedure Orders:       1  CENTRAL LINE [[id number]] ordered by Jody Hernandez MD at 10/09/17 2146                 Post-procedure Diagnoses:       1  Septic shock ([facility] ) [A41 9, R65 21]       2  Hypotension [I95 9]              Attestation signed by Kei Hancock MD at 10/10/2017  1:13 AM      I was present and directly supervised this procedure  Time does not include critical care time  There were no complications    Date of service- 10/9/2017                     Central Line Insertion  Date/Time: 10/9/2017 9:46 PM  Performed by: Evelina Campo by: Nati Dunaway     Patient location:  Bedside  Other Assisting Provider:  No    Consent:     Consent obtained:  Verbal    Consent given by:  Patient    Risks discussed:  Arterial puncture, incorrect placement, nerve damage, pneumothorax, infection and bleeding    Alternatives discussed:  No treatment  Universal protocol:     Patient identity confirmed:  Verbally with patient  Pre-procedure details:     Skin preparation:  2% chlorhexidine    Skin preparation agent: Skin preparation agent completely dried prior to procedure    Indications:     Central line indications: medications requiring central line and hemodynamic monitoring    Anesthesia (see MAR for exact dosages):      Anesthesia method:  Local infiltration    Local anesthetic:  Lidocaine 1% w/o epi  Procedure details:     Location:  Right internal jugular    Vessel type: vein      Laterality:  Right    Approach: percutaneous technique used      Patient position:  Trendelenburg    Catheter type:  Triple lumen 16cm    Catheter size:  7 Fr    Landmarks identified: yes      Ultrasound guidance: yes      Sterile ultrasound techniques: Sterile gel and sterile probe covers were used      Number of attempts:  1    Successful placement: yes    Post-procedure details:     Post-procedure:  Dressing applied and line sutured    Assessment:  Blood return through all ports, no pneumothorax on x-ray, placement verified by x-ray and free fluid flow    Patient tolerance of procedure:   Tolerated well, no immediate complications                       Received for:Provider  EPIC   Oct 10 2017  1:14AM [facility] Standard Time

## 2018-02-23 ENCOUNTER — APPOINTMENT (OUTPATIENT)
Dept: WOUND CARE | Facility: HOSPITAL | Age: 53
End: 2018-02-23
Payer: COMMERCIAL

## 2018-02-23 PROCEDURE — 11042 DBRDMT SUBQ TIS 1ST 20SQCM/<: CPT

## 2018-02-23 PROCEDURE — 99213 OFFICE O/P EST LOW 20 MIN: CPT

## 2018-03-02 ENCOUNTER — APPOINTMENT (OUTPATIENT)
Dept: WOUND CARE | Facility: HOSPITAL | Age: 53
End: 2018-03-02
Payer: COMMERCIAL

## 2018-03-02 PROCEDURE — 11042 DBRDMT SUBQ TIS 1ST 20SQCM/<: CPT

## 2018-03-09 ENCOUNTER — APPOINTMENT (OUTPATIENT)
Dept: WOUND CARE | Facility: HOSPITAL | Age: 53
End: 2018-03-09
Payer: COMMERCIAL

## 2018-03-09 PROCEDURE — 11042 DBRDMT SUBQ TIS 1ST 20SQCM/<: CPT

## 2018-03-16 ENCOUNTER — APPOINTMENT (OUTPATIENT)
Dept: WOUND CARE | Facility: HOSPITAL | Age: 53
End: 2018-03-16
Payer: COMMERCIAL

## 2018-03-16 PROCEDURE — 11042 DBRDMT SUBQ TIS 1ST 20SQCM/<: CPT

## 2018-03-23 ENCOUNTER — APPOINTMENT (OUTPATIENT)
Dept: WOUND CARE | Facility: HOSPITAL | Age: 53
End: 2018-03-23
Payer: COMMERCIAL

## 2018-03-23 PROCEDURE — 15004 WOUND PREP F/N/HF/G: CPT

## 2018-04-13 ENCOUNTER — APPOINTMENT (OUTPATIENT)
Dept: WOUND CARE | Facility: HOSPITAL | Age: 53
End: 2018-04-13
Payer: COMMERCIAL

## 2018-04-13 PROCEDURE — 11042 DBRDMT SUBQ TIS 1ST 20SQCM/<: CPT

## 2018-04-20 ENCOUNTER — APPOINTMENT (OUTPATIENT)
Dept: WOUND CARE | Facility: HOSPITAL | Age: 53
End: 2018-04-20
Payer: COMMERCIAL

## 2018-04-20 PROCEDURE — 99212 OFFICE O/P EST SF 10 MIN: CPT

## 2018-05-04 ENCOUNTER — APPOINTMENT (OUTPATIENT)
Dept: WOUND CARE | Facility: HOSPITAL | Age: 53
End: 2018-05-04
Payer: COMMERCIAL

## 2018-05-04 PROCEDURE — 99212 OFFICE O/P EST SF 10 MIN: CPT

## 2020-11-16 ENCOUNTER — OFFICE VISIT (OUTPATIENT)
Dept: PODIATRY | Facility: CLINIC | Age: 55
End: 2020-11-16
Payer: COMMERCIAL

## 2020-11-16 VITALS — TEMPERATURE: 96.3 F | WEIGHT: 293 LBS | HEIGHT: 64 IN | BODY MASS INDEX: 50.02 KG/M2

## 2020-11-16 DIAGNOSIS — E11.42 DIABETIC POLYNEUROPATHY ASSOCIATED WITH TYPE 2 DIABETES MELLITUS (HCC): Primary | ICD-10-CM

## 2020-11-16 PROCEDURE — 99213 OFFICE O/P EST LOW 20 MIN: CPT | Performed by: PODIATRIST

## 2020-11-16 RX ORDER — METHOCARBAMOL 750 MG/1
TABLET, FILM COATED ORAL
COMMUNITY
Start: 2020-10-19

## 2020-11-16 RX ORDER — CLONAZEPAM 1 MG/1
1 TABLET ORAL 2 TIMES DAILY
COMMUNITY
Start: 2020-06-18

## 2020-11-16 RX ORDER — OXYCODONE HYDROCHLORIDE 10 MG/1
TABLET ORAL
COMMUNITY
Start: 2020-10-21

## 2020-11-16 RX ORDER — FAMOTIDINE 20 MG/1
TABLET, FILM COATED ORAL
COMMUNITY
Start: 2020-08-31

## 2020-11-16 RX ORDER — INSULIN HUMAN 500 [IU]/ML
INJECTION, SOLUTION SUBCUTANEOUS
COMMUNITY
Start: 2020-10-09

## 2020-11-16 RX ORDER — DULOXETIN HYDROCHLORIDE 20 MG/1
60 CAPSULE, DELAYED RELEASE ORAL 2 TIMES DAILY
COMMUNITY
Start: 2020-07-20

## 2020-11-16 RX ORDER — SEMAGLUTIDE 1.34 MG/ML
INJECTION, SOLUTION SUBCUTANEOUS
COMMUNITY
Start: 2020-10-19 | End: 2021-07-22

## 2020-11-16 RX ORDER — INSULIN LISPRO 100 [IU]/ML
INJECTION, SOLUTION INTRAVENOUS; SUBCUTANEOUS
COMMUNITY
Start: 2020-11-02

## 2020-11-16 RX ORDER — BLOOD SUGAR DIAGNOSTIC
STRIP MISCELLANEOUS
COMMUNITY
Start: 2020-11-02

## 2020-11-16 RX ORDER — PREGABALIN 150 MG/1
CAPSULE ORAL
COMMUNITY
Start: 2020-11-02

## 2020-11-16 RX ORDER — DULOXETIN HYDROCHLORIDE 60 MG/1
CAPSULE, DELAYED RELEASE ORAL
COMMUNITY
Start: 2020-10-19

## 2020-11-16 RX ORDER — LISINOPRIL 5 MG/1
TABLET ORAL
COMMUNITY
Start: 2020-10-07

## 2021-01-25 ENCOUNTER — TELEPHONE (OUTPATIENT)
Dept: OTHER | Facility: OTHER | Age: 56
End: 2021-01-25

## 2021-01-25 NOTE — TELEPHONE ENCOUNTER
Patient calling to cancel appointment schedule for 1/25/2021 at 10:30 AM with Dr Cervantes Members, DPM due to she been up all night with a migraine and vomiting  She will call back to reschedule

## 2021-07-22 ENCOUNTER — OFFICE VISIT (OUTPATIENT)
Dept: PODIATRY | Facility: CLINIC | Age: 56
End: 2021-07-22
Payer: COMMERCIAL

## 2021-07-22 VITALS
BODY MASS INDEX: 50.02 KG/M2 | DIASTOLIC BLOOD PRESSURE: 81 MMHG | HEART RATE: 98 BPM | HEIGHT: 64 IN | WEIGHT: 293 LBS | SYSTOLIC BLOOD PRESSURE: 147 MMHG

## 2021-07-22 DIAGNOSIS — E11.42 DIABETIC POLYNEUROPATHY ASSOCIATED WITH TYPE 2 DIABETES MELLITUS (HCC): Primary | ICD-10-CM

## 2021-07-22 PROCEDURE — 99212 OFFICE O/P EST SF 10 MIN: CPT | Performed by: PODIATRIST

## 2021-07-22 RX ORDER — ATORVASTATIN CALCIUM 20 MG/1
TABLET, FILM COATED ORAL
COMMUNITY
Start: 2021-05-18

## 2021-07-22 RX ORDER — SEMAGLUTIDE 1.34 MG/ML
INJECTION, SOLUTION SUBCUTANEOUS
COMMUNITY
Start: 2021-06-26

## 2021-07-22 RX ORDER — CARVEDILOL 25 MG/1
TABLET ORAL
COMMUNITY
Start: 2021-06-18

## 2021-07-22 RX ORDER — FUROSEMIDE 40 MG/1
TABLET ORAL
COMMUNITY
Start: 2021-06-18

## 2021-07-22 NOTE — PROGRESS NOTES
Patient presents for palliative toenail care  No palpable pedal pulses  All toenails are elongated  Both feet are very numb per [place] monofilament  Blood sugars have been elevated and patient was urged to try to watch her diet and take her  Medications as directed  Prescribed diabetic shoes and insoles at her request   All toenails were trimmed  Patient is rescheduled for 3 months

## 2021-11-01 ENCOUNTER — OFFICE VISIT (OUTPATIENT)
Dept: PODIATRY | Facility: CLINIC | Age: 56
End: 2021-11-01
Payer: COMMERCIAL

## 2021-11-01 VITALS — WEIGHT: 293 LBS | HEIGHT: 64 IN | BODY MASS INDEX: 50.02 KG/M2

## 2021-11-01 DIAGNOSIS — E11.42 DIABETIC POLYNEUROPATHY ASSOCIATED WITH TYPE 2 DIABETES MELLITUS (HCC): Primary | ICD-10-CM

## 2021-11-01 PROCEDURE — 99213 OFFICE O/P EST LOW 20 MIN: CPT | Performed by: PODIATRIST

## 2022-02-07 ENCOUNTER — TELEPHONE (OUTPATIENT)
Dept: PODIATRY | Facility: CLINIC | Age: 57
End: 2022-02-07

## 2022-02-07 NOTE — TELEPHONE ENCOUNTER
Pema West called, she got glass in her foot on Saturday and does not know if she got it out  She is diabetic and has neuropathy  She needs to be seen this week but there are no available appointments  Please advise

## 2022-02-08 ENCOUNTER — OFFICE VISIT (OUTPATIENT)
Dept: PODIATRY | Facility: CLINIC | Age: 57
End: 2022-02-08
Payer: COMMERCIAL

## 2022-02-08 VITALS
SYSTOLIC BLOOD PRESSURE: 152 MMHG | HEART RATE: 80 BPM | DIASTOLIC BLOOD PRESSURE: 85 MMHG | HEIGHT: 64 IN | BODY MASS INDEX: 50.02 KG/M2 | WEIGHT: 293 LBS

## 2022-02-08 DIAGNOSIS — S90.851A FOREIGN BODY IN RIGHT FOOT, INITIAL ENCOUNTER: ICD-10-CM

## 2022-02-08 DIAGNOSIS — E11.42 DIABETIC POLYNEUROPATHY ASSOCIATED WITH TYPE 2 DIABETES MELLITUS (HCC): Primary | ICD-10-CM

## 2022-02-08 PROCEDURE — 99213 OFFICE O/P EST LOW 20 MIN: CPT | Performed by: PODIATRIST

## 2022-02-08 NOTE — PROGRESS NOTES
Assessment/Plan:    I personally reviewed x-rays of the right foot  There is no evidence of retained glass  Clinically, there does not appear to be issues with a foreign body at this time  There are healing abrasions that do not appear infected  Patient urged to wear shoes when walking barefoot in her home  Trimmed multiple dystrophic toenails  She is rescheduled for palliative care in 3 months  She will contact me prior to that appointment should she run into difficulty with possible infection from the glass puncture  No problem-specific Assessment & Plan notes found for this encounter  Diagnoses and all orders for this visit:    Diabetic polyneuropathy associated with type 2 diabetes mellitus ([address] )    Foreign body in right foot, initial encounter  -     X-ray foot right 3+ views; Future          Subjective:      Patient ID: Vidal Alvarenga is a 55 y o  female  HPI     Patient, a 44-year-old type 2 diabetic with no neuropathy and numbness presents with concern that she stepped on glass on her right foot approximately 4 days ago  She has been applying Neosporin to the foot  She has no pain as she is neuropathic  She is concerned that last may be still present in her foot  The following portions of the patient's history were reviewed and updated as appropriate: allergies, current medications, past family history, past medical history, past social history, past surgical history and problem list     Review of Systems   Cardiovascular:        History of congestive heart failure   Neurological: Positive for numbness  Objective:      /85   Pulse 80   Ht 5' 4" (1 626 m)   Wt (!) 165 kg (363 lb 3 2 oz)   BMI 62 34 kg/m²          Physical Exam  Constitutional:       Appearance: She is obese  Cardiovascular:      Pulses: Normal pulses  Musculoskeletal:         General: Normal range of motion  Skin:     Comments: Three small cuts are noted plantar aspect right foot  Abrasion noted dorsum left foot  No evidence of discharge or infection  Neurological:      General: No focal deficit present  Mental Status: She is oriented to person, place, and time  Sensory: Sensory deficit present

## 2022-05-09 ENCOUNTER — OFFICE VISIT (OUTPATIENT)
Dept: PODIATRY | Facility: CLINIC | Age: 57
End: 2022-05-09
Payer: COMMERCIAL

## 2022-05-09 VITALS
WEIGHT: 293 LBS | DIASTOLIC BLOOD PRESSURE: 100 MMHG | BODY MASS INDEX: 50.02 KG/M2 | HEART RATE: 123 BPM | HEIGHT: 64 IN | SYSTOLIC BLOOD PRESSURE: 183 MMHG

## 2022-05-09 DIAGNOSIS — E11.42 DIABETIC POLYNEUROPATHY ASSOCIATED WITH TYPE 2 DIABETES MELLITUS (HCC): Primary | ICD-10-CM

## 2022-05-09 PROCEDURE — 99212 OFFICE O/P EST SF 10 MIN: CPT | Performed by: PODIATRIST

## 2022-05-09 NOTE — PROGRESS NOTES
Patient presents for palliative diabetic foot care  No acute pedal disorder noted  All toenails are elongated  Pedal pulses are palpable  Treatment consisted of nail trimming

## 2022-10-27 ENCOUNTER — OFFICE VISIT (OUTPATIENT)
Dept: PODIATRY | Facility: CLINIC | Age: 57
End: 2022-10-27
Payer: COMMERCIAL

## 2022-10-27 VITALS
DIASTOLIC BLOOD PRESSURE: 97 MMHG | HEIGHT: 64 IN | WEIGHT: 293 LBS | HEART RATE: 160 BPM | SYSTOLIC BLOOD PRESSURE: 151 MMHG | BODY MASS INDEX: 50.02 KG/M2

## 2022-10-27 DIAGNOSIS — E11.42 DIABETIC POLYNEUROPATHY ASSOCIATED WITH TYPE 2 DIABETES MELLITUS (HCC): Primary | ICD-10-CM

## 2022-10-27 PROCEDURE — 99213 OFFICE O/P EST LOW 20 MIN: CPT | Performed by: PODIATRIST

## 2022-10-27 NOTE — PROGRESS NOTES
Assessment/Plan:    Discussed principles of diabetic foot care  Patient has trace pedal pulses and an absence sensorium  She is a mildly positive Tinel's signs at the posterior tibial nerve and superficial peroneal nerve  All toenails are markedly dystrophic  Her skin is dry  Advised patient to utilize moisturizer as much as possible  Trim dystrophic toenails  Patient will be rescheduled in 3 months for palliative care  She also was referred to Dr Brian Maldonado to see if she is a candidate for nerve decompression  She is failing oral medications for neuropathy  No problem-specific Assessment & Plan notes found for this encounter  Diagnoses and all orders for this visit:    Diabetic polyneuropathy associated with type 2 diabetes mellitus ([address] )          Subjective:      Patient ID: Speedy Lara is a 65 y o  female  HPI    Patient, a 64-year-old type 2 diabetic female presents for her yearly diabetic foot exam   Patient states that her feet are very uncomfortable  They feel very heavy, burn and tingle  She states that she is taking medicines such as Lyrica 150 mg t i d  Along with Cymbalta but not getting much relief in regards to her neuropathy  I personally reviewed A1c dated 04/20/2022  It was 8 0  I personally reviewed A1c dated 04/28/2021  It was 8 0  The following portions of the patient's history were reviewed and updated as appropriate: allergies, current medications, past family history, past medical history, past social history, past surgical history and problem list     Review of Systems   Cardiovascular:        History of congestive heart failure   Genitourinary:        History of liver and colon cancer;  UTI   Neurological: Positive for numbness               Objective:      /97   Pulse (!) 160   Ht 5' 4" (1 626 m) Comment: verbal  Wt (!) 166 kg (366 lb 3 2 oz)   BMI 62 86 kg/m²          Physical Exam  Cardiovascular:      Pulses: no weak pulses          Dorsalis pedis pulses are 1+ on the right side and 1+ on the left side  Posterior tibial pulses are 1+ on the right side and 1+ on the left side  Feet:      Right foot:      Skin integrity: Dry skin present  No ulcer, skin breakdown, erythema, warmth or callus  Left foot:      Skin integrity: Dry skin present  No ulcer, skin breakdown, erythema, warmth or callus  Diabetic Foot Exam    Patient's shoes and socks removed  Right Foot/Ankle   Right Foot Inspection  Skin Exam: skin normal, skin intact and dry skin  No warmth, no callus, no erythema, no maceration, no abnormal color, no pre-ulcer, no ulcer and no callus  Toe Exam: ROM and strength within normal limits  Sensory   Vibration: absent  Proprioception: intact  Monofilament testing: absent    Vascular  Capillary refills: < 3 seconds  The right DP pulse is 1+  The right PT pulse is 1+  Right Toe  - Comprehensive Exam  Ecchymosis: none  Arch: normal  Hammertoes: absent  Claw Toes: absent  Swelling: none   Tenderness: none         Left Foot/Ankle  Left Foot Inspection  Skin Exam: skin normal, skin intact and dry skin  No warmth, no erythema, no maceration, normal color, no pre-ulcer, no ulcer and no callus  Toe Exam: ROM and strength within normal limits  Sensory   Vibration: absent  Proprioception: intact  Monofilament testing: absent    Vascular  Capillary refills: < 3 seconds  The left DP pulse is 1+  The left PT pulse is 1+       Left Toe  - Comprehensive Exam  Ecchymosis: none  Arch: normal  Hammertoes: absent  Claw toes: absent  Swelling: none   Tenderness: none           Assign Risk Category  No deformity present  Loss of protective sensation  No weak pulses  Risk: 1

## 2022-12-09 ENCOUNTER — OFFICE VISIT (OUTPATIENT)
Dept: PODIATRY | Facility: CLINIC | Age: 57
End: 2022-12-09

## 2022-12-09 VITALS — BODY MASS INDEX: 50.02 KG/M2 | WEIGHT: 293 LBS | HEIGHT: 64 IN

## 2022-12-09 DIAGNOSIS — G57.33 PERONEAL NERVE LESION OF LOWER EXTREMITY, BILATERAL: ICD-10-CM

## 2022-12-09 DIAGNOSIS — I73.9 PERIPHERAL VASCULAR DISEASE, UNSPECIFIED (HCC): ICD-10-CM

## 2022-12-09 DIAGNOSIS — E11.42 DIABETIC POLYNEUROPATHY ASSOCIATED WITH TYPE 2 DIABETES MELLITUS (HCC): Primary | ICD-10-CM

## 2022-12-09 DIAGNOSIS — G57.63 LESION OF PLANTAR NERVE, BILATERAL LOWER LIMBS: ICD-10-CM

## 2022-12-09 DIAGNOSIS — G57.53 TARSAL TUNNEL SYNDROME, BILATERAL: ICD-10-CM

## 2022-12-09 NOTE — PROGRESS NOTES
PATIENT:  Dianne Anderson    1965    ASSESSMENT:     1  Diabetic polyneuropathy associated with type 2 diabetes mellitus (HCC)  VAS lower limb arterial duplex, complete bilateral      2  Peripheral vascular disease, unspecified (HCC)  VAS lower limb arterial duplex, complete bilateral      3  Tarsal tunnel syndrome, bilateral        4  Peroneal nerve lesion of lower extremity, bilateral        5  Lesion of plantar nerve, bilateral lower limbs                PLAN:  1  Reviewed medical records   Reviewed images and recent blood work  Reviewed the notes from Dr Simran Malagon was counseled and educated on the condition and the diagnosis  2  The diagnosis, treatment options and prognosis were discussed with the patient     3  She has diabetic neuropathy with superimposed nerve compression including tibial, common peroneal, and deep peroneal nerve  4  Discussed surgical nerve decompression  She would be a good candidate, but higher risk due to her weight, LE edema, and medical co-morbidities  5  Discussed surgical details with her  She would consider surgical option in the future  6  Sent her for arterial study concerning long standing diabetes with decreased pedal pulses  7  She will follow-up with Dr Kenzie Fernandez as scheduled          Imaging: I have personally reviewed pertinent films in PACS  Labs, pathology, and Other Studies: I have personally reviewed pertinent reports         Subjective:      HPI  The patient was referred by Dr Kenzie Fernandez for surgical consultation on her neuropathy  She has diabetes for about 15-16 years  Her BS is under control now  She has significant numbness and paresthesia in her feet due to neuropathy  Pain is still significant at night time even though she is on high dose Lyrica, Cymbalta, and oxycodone  She also reports significant arthritis  No acute swelling or redness in her feet          The following portions of the patient's history were reviewed and updated as appropriate: allergies, current medications, past family history, past medical history, past social history, past surgical history and problem list   All pertinent labs and images were reviewed      Past Medical History  Past Medical History:   Diagnosis Date   • Cancer ([place] [address] )     colon   • CHF (congestive heart failure) (HCC)    • Diabetes mellitus ([place] [address] )    • Hypertension    • Psychiatric disorder        Past Surgical History  Past Surgical History:   Procedure Laterality Date   • COLON SURGERY          Allergies:  Atropine    Medications:  Current Outpatient Medications   Medication Sig Dispense Refill   • ASPIRIN ADULT LOW DOSE PO Take by mouth     • atorvastatin (LIPITOR) 20 mg tablet      • carvedilol (COREG) 25 mg tablet      • clonazePAM (KlonoPIN) 1 mg tablet Take 1 mg by mouth 2 (two) times a day     • Cranberry 1000 MG CAPS Take by mouth     • DULoxetine (CYMBALTA) 20 mg capsule Take 60 mg by mouth 2 (two) times a day     • DULoxetine (CYMBALTA) 60 mg delayed release capsule      • DULoxetine HCl (CYMBALTA PO) Take by mouth      • famotidine (PEPCID) 20 mg tablet      • furosemide (LASIX) 40 mg tablet      • HumuLIN R U-500 KwikPen 500 units/mL CONCENTRATED U-500 injection pen      • insulin lispro (HumaLOG) 100 units/mL injection pen      • lisinopril (ZESTRIL) 5 mg tablet      • methocarbamol (ROBAXIN) 750 mg tablet      • OneTouch Verio test strip      • oxyCODONE (ROXICODONE) 10 MG TABS      • OxyMORphone HCl (OPANA PO) Take by mouth     • Ozempic, 1 MG/DOSE, 4 MG/3ML SOPN injection pen      • pregabalin (LYRICA) 150 mg capsule      • RaNITidine HCl (ZANTAC PO) Take by mouth     • ATORVASTATIN CALCIUM PO Take by mouth (Patient not taking: Reported on 7/22/2021)     • ClonazePAM (KLONOPIN PO) Take by mouth (Patient not taking: Reported on 7/22/2021)     • insulin aspart protamine-insulin aspart (NovoLOG 70/30) 100 units/mL injection Inject 40 Units under the skin every 6 (six) hours for 30 days 4800 Units 0   • Pregabalin (LYRICA PO) Take by mouth  (Patient not taking: Reported on 5/9/2022)       No current facility-administered medications for this visit  Social History:  Social History     Socioeconomic History   • Marital status: Unknown     Spouse name: None   • Number of children: None   • Years of education: None   • Highest education level: None   Occupational History   • None   Tobacco Use   • Smoking status: Never   • Smokeless tobacco: Never   Vaping Use   • Vaping Use: Never used   Substance and Sexual Activity   • Alcohol use: No   • Drug use: No   • Sexual activity: None   Other Topics Concern   • None   Social History Narrative   • None     Social Determinants of Health     Financial Resource Strain: Not on file   Food Insecurity: Not on file   Transportation Needs: Not on file   Physical Activity: Not on file   Stress: Not on file   Social Connections: Not on file   Intimate Partner Violence: Not on file   Housing Stability: Not on file        Review of Systems   Constitutional: Negative for chills and fatigue  Respiratory: Negative for cough and shortness of breath  Cardiovascular: Negative for chest pain  Gastrointestinal: Negative for nausea and vomiting  Musculoskeletal: Positive for arthralgias and back pain  Skin: Negative for wound  Neurological: Positive for numbness  Negative for weakness  Hematological: Negative  Psychiatric/Behavioral: Negative for behavioral problems and confusion  Objective:      Ht 5' 4" (1 626 m) Comment: verbal  Wt (!) 166 kg (366 lb)   BMI 62 82 kg/m²          Physical Exam  Vitals reviewed  Constitutional:       General: She is not in acute distress  Appearance: She is obese  She is not toxic-appearing  HENT:      Head: Normocephalic and atraumatic  Eyes:      Extraocular Movements: Extraocular movements intact  Cardiovascular:      Rate and Rhythm: Normal rate and regular rhythm  Pulses: Decreased pulses  Dorsalis pedis pulses are 0 on the right side and 0 on the left side  Posterior tibial pulses are 0 on the right side and 0 on the left side  Comments: No ischemia or gangrene  Pulmonary:      Effort: Pulmonary effort is normal  No respiratory distress  Musculoskeletal:         General: No signs of injury  Cervical back: Normal range of motion and neck supple  Right lower leg: Edema present  Left lower leg: Edema present  Right foot: No Charcot foot or foot drop  Left foot: No Charcot foot or foot drop  Comments: Tinel sign presents at bilateral tarsal tunnel, fibular neck, and 1st dorsal webspace  No acute joint swelling or redness  Diffuse, chronic swelling around ankles  Feet:      Right foot:      Protective Sensation: 10 sites tested  8 sites sensed  Left foot:      Protective Sensation: 10 sites tested  6 sites sensed  Skin:     General: Skin is warm  Capillary Refill: Capillary refill takes less than 2 seconds  Coloration: Skin is not cyanotic or mottled  Findings: No abscess, ecchymosis, erythema or wound  Nails: There is no clubbing  Neurological:      General: No focal deficit present  Mental Status: She is alert and oriented to person, place, and time  Cranial Nerves: No cranial nerve deficit  Motor: No weakness  Coordination: Coordination normal    Psychiatric:         Mood and Affect: Mood normal          Behavior: Behavior normal          Thought Content:  Thought content normal          Judgment: Judgment normal

## 2023-01-30 ENCOUNTER — OFFICE VISIT (OUTPATIENT)
Dept: PODIATRY | Facility: CLINIC | Age: 58
End: 2023-01-30

## 2023-01-30 VITALS
HEART RATE: 89 BPM | HEIGHT: 64 IN | DIASTOLIC BLOOD PRESSURE: 75 MMHG | SYSTOLIC BLOOD PRESSURE: 137 MMHG | BODY MASS INDEX: 62.82 KG/M2

## 2023-01-30 DIAGNOSIS — E11.42 DIABETIC POLYNEUROPATHY ASSOCIATED WITH TYPE 2 DIABETES MELLITUS (HCC): Primary | ICD-10-CM

## 2023-01-30 RX ORDER — ALCOHOL ANTISEPTIC PADS
PADS, MEDICATED (EA) TOPICAL
COMMUNITY
Start: 2023-01-09

## 2023-01-30 RX ORDER — DIAZEPAM 10 MG/1
TABLET ORAL
COMMUNITY
Start: 2023-01-27

## 2023-01-30 NOTE — PROGRESS NOTES
Patient, a 81-year-old female with known diabetic neuropathy presents for palliative care  Chief complaint today is a callus along the medial aspect of the right heel  She relates burning pain at this site  On exam, all toenails are elongated  Pedal pulses are trace  Hyperkeratotic lesion right heel but no evidence of fissure  Treatment consists of nail trimming  Advised nocturnal use of bag balm  Patient is rescheduled in 10 weeks  Note: Dr Wilner Rey, podiatry resident assisted in the care of this patient

## 2024-02-21 PROBLEM — N39.0 UTI (URINARY TRACT INFECTION): Status: RESOLVED | Noted: 2017-10-13 | Resolved: 2024-02-21

## 2024-02-21 PROBLEM — N30.00 ACUTE CYSTITIS: Status: RESOLVED | Noted: 2017-10-09 | Resolved: 2024-02-21

## 2024-03-14 ENCOUNTER — OFFICE VISIT (OUTPATIENT)
Dept: PODIATRY | Facility: CLINIC | Age: 59
End: 2024-03-14
Payer: COMMERCIAL

## 2024-03-14 VITALS
DIASTOLIC BLOOD PRESSURE: 68 MMHG | SYSTOLIC BLOOD PRESSURE: 121 MMHG | HEART RATE: 89 BPM | HEIGHT: 63 IN | BODY MASS INDEX: 51.91 KG/M2 | WEIGHT: 293 LBS

## 2024-03-14 DIAGNOSIS — I73.9 PERIPHERAL VASCULAR DISEASE, UNSPECIFIED (HCC): ICD-10-CM

## 2024-03-14 DIAGNOSIS — E11.42 DIABETIC POLYNEUROPATHY ASSOCIATED WITH TYPE 2 DIABETES MELLITUS (HCC): Primary | ICD-10-CM

## 2024-03-14 PROCEDURE — 99212 OFFICE O/P EST SF 10 MIN: CPT | Performed by: PODIATRIST

## 2024-03-14 NOTE — PROGRESS NOTES
Patient presents for palliative diabetic footcare.  Chief complaint involves elongated and thick toenails that she cannot trim.  There is no evidence of infection at this time.  Patient's blood sugar is much better than it had been.  It is currently 7.0 at 11/28/2023.    Treatment consists of trimming of multiple dystrophic toenails.  Reappoint 3 months.

## 2024-08-03 DIAGNOSIS — Z23 IMMUNIZATION DUE: Primary | ICD-10-CM

## 2024-08-05 RX ORDER — ZOSTER VACCINE RECOMBINANT, ADJUVANTED 50 MCG/0.5
KIT INTRAMUSCULAR
Qty: 1 EACH | Refills: 0 | Status: SHIPPED | OUTPATIENT
Start: 2024-08-05

## 2025-06-03 ENCOUNTER — PROCEDURE VISIT (OUTPATIENT)
Dept: PODIATRY | Facility: CLINIC | Age: 60
End: 2025-06-03
Payer: COMMERCIAL

## 2025-06-03 DIAGNOSIS — B35.1 ONYCHOMYCOSIS: ICD-10-CM

## 2025-06-03 DIAGNOSIS — E11.42 DIABETIC POLYNEUROPATHY ASSOCIATED WITH TYPE 2 DIABETES MELLITUS (HCC): Primary | ICD-10-CM

## 2025-06-03 DIAGNOSIS — I73.9 PERIPHERAL VASCULAR DISEASE, UNSPECIFIED (HCC): ICD-10-CM

## 2025-06-03 DIAGNOSIS — L84 CORNS: ICD-10-CM

## 2025-06-03 PROCEDURE — 11055 PARING/CUTG B9 HYPRKER LES 1: CPT | Performed by: PODIATRIST

## 2025-06-03 PROCEDURE — 99213 OFFICE O/P EST LOW 20 MIN: CPT | Performed by: PODIATRIST

## 2025-06-03 PROCEDURE — 11721 DEBRIDE NAIL 6 OR MORE: CPT | Performed by: PODIATRIST

## 2025-06-03 NOTE — PROGRESS NOTES
Name: Mago Albert      : 1965      MRN: [id number]  Encounter Provider: Michi Cuevas DPM  Encounter Date: 6/3/2025   Encounter department: [facility] [place]    Discussed principles of diabetic footcare.  Sensorium is absent per [place] monofilament wire.  There are no palpable pedal pulses.    All toenails are thick and yellow secondary to onychomycosis.  Debridement was performed reducing nails in thickness and removing devitalized tissue and debris.  Electrical and manual debridement performed.    Trimmed hyperkeratotic lesion at the tip of the right second toe.  :  Assessment & Plan  Diabetic polyneuropathy associated with type 2 diabetes mellitus (HCC)  Diabetic foot exam  Lab Results   Component Value Date    HGBA1C 7.2 (H) 2025            Peripheral vascular disease, unspecified (HCC)         Corns  Lesion trimming       Onychomycosis  Nail debridement           History of Present Illness   HPI  Mago Albert is a 59 y.o. female who presents for yearly diabetic foot exam along with nail and callus care.  Patient is a type II diabetic with known neuropathy.  She states that since she was last seen she fractured the right second toe.  She relates no discomfort in the digit at this time.  However patient is neuropathic and is numb.  Digital alignment is acceptable.  There is a corn at the tip of the right second toe however.  I personally reviewed an A1c dated 2025.  It was 7.2.    I personally had an A1c dated 2024.  It was 6.8.        Review of Systems   Cardiovascular:         Chronic congestive heart failure   Gastrointestinal:         History of colon cancer   Psychiatric/Behavioral:          Opioid dependence              Objective   There were no vitals taken for this visit.     Physical Exam    Cardiovascular:      Pulses: Pulses are weak.           Dorsalis pedis pulses are 0 on the right side and 2+ on the left side.        Posterior tibial pulses are  0 on the right side and 0 on the left side.   Feet:      Right foot:      Skin integrity: Callus present. No ulcer, skin breakdown, erythema, warmth or dry skin.      Left foot:      Skin integrity: No ulcer, skin breakdown, erythema, warmth, callus or dry skin.         Diabetic Foot Exam    Patient's shoes and socks removed.    Right Foot/Ankle   Right Foot Inspection  Skin Exam: skin normal, skin intact, callus and callus. No dry skin, no warmth, no erythema, no maceration, no abnormal color, no pre-ulcer and no ulcer.     Toe Exam: ROM and strength within normal limits.     Sensory   Vibration: absent  Proprioception: intact  Monofilament testing: absent    Vascular  Capillary refills: elevated  The right DP pulse is 0. The right PT pulse is 0.     Right Toe  - Comprehensive Exam  Ecchymosis: none  Arch: normal  Hammertoes: second toe  Claw Toes: absent  Swelling: lesser metatarsophalangeal joints   Tenderness: none       Left Foot/Ankle  Left Foot Inspection  Skin Exam: skin normal and skin intact. No dry skin, no warmth, no erythema, no maceration, normal color, no pre-ulcer, no ulcer and no callus.     Toe Exam: ROM and strength within normal limits.     Sensory   Vibration: absent  Proprioception: intact  Monofilament testing: absent    Vascular  Capillary refills: elevated  The left DP pulse is 2+. The left PT pulse is 0.     Left Toe  - Comprehensive Exam  Ecchymosis: none  Arch: normal  Hammertoes: absent  Claw toes: absent  Swelling: lesser metatarsophalangeal joints   Tenderness: none       Assign Risk Category  Deformity present  Loss of protective sensation  Weak pulses  Risk: 2  Lesion Destruction    Date/Time: 6/3/2025 6:00 PM    Performed by: Michi Cuevas DPM  Authorized by: Michi Cuevas DPM    Procedure Details - Lesion Destruction:     Number of Lesions:  1  Lesion 1:     Body area:  Lower extremity    Lower extremity location:  R second toe    Malignancy: benign hyperkeratotic lesion       Destruction method: scissors used for extraction